# Patient Record
Sex: MALE | Race: WHITE | NOT HISPANIC OR LATINO | Employment: FULL TIME | URBAN - NONMETROPOLITAN AREA
[De-identification: names, ages, dates, MRNs, and addresses within clinical notes are randomized per-mention and may not be internally consistent; named-entity substitution may affect disease eponyms.]

---

## 2018-12-29 ENCOUNTER — HOSPITAL ENCOUNTER (EMERGENCY)
Facility: HOSPITAL | Age: 23
Discharge: HOME | End: 2018-12-29
Attending: EMERGENCY MEDICINE | Admitting: EMERGENCY MEDICINE

## 2018-12-29 ENCOUNTER — APPOINTMENT (OUTPATIENT)
Dept: CT IMAGING | Facility: HOSPITAL | Age: 23
End: 2018-12-29

## 2018-12-29 VITALS
TEMPERATURE: 98 F | WEIGHT: 198.8 LBS | HEART RATE: 74 BPM | BODY MASS INDEX: 26.93 KG/M2 | DIASTOLIC BLOOD PRESSURE: 65 MMHG | RESPIRATION RATE: 18 BRPM | HEIGHT: 72 IN | OXYGEN SATURATION: 99 % | SYSTOLIC BLOOD PRESSURE: 135 MMHG

## 2018-12-29 DIAGNOSIS — R10.9 ABDOMINAL PAIN, UNSPECIFIED ABDOMINAL LOCATION: ICD-10-CM

## 2018-12-29 DIAGNOSIS — K37 APPENDICITIS, UNSPECIFIED APPENDICITIS TYPE: Primary | ICD-10-CM

## 2018-12-29 LAB
ALBUMIN SERPL-MCNC: 4.9 G/DL (ref 3.5–5)
ALBUMIN/GLOB SERPL: 1.8 G/DL (ref 1–2)
ALP SERPL-CCNC: 48 U/L (ref 38–126)
ALT SERPL W P-5'-P-CCNC: 41 U/L (ref 13–69)
ANION GAP SERPL CALCULATED.3IONS-SCNC: 13.1 MMOL/L (ref 10–20)
AST SERPL-CCNC: 29 U/L (ref 15–46)
BACTERIA UR QL AUTO: ABNORMAL /HPF
BASOPHILS # BLD AUTO: 0.07 10*3/MM3 (ref 0–0.2)
BASOPHILS NFR BLD AUTO: 1 % (ref 0–2.5)
BILIRUB SERPL-MCNC: 0.7 MG/DL (ref 0.2–1.3)
BILIRUB UR QL STRIP: NEGATIVE
BUN BLD-MCNC: 9 MG/DL (ref 7–20)
BUN/CREAT SERPL: 15 (ref 6.3–21.9)
CALCIUM SPEC-SCNC: 9.3 MG/DL (ref 8.4–10.2)
CHLORIDE SERPL-SCNC: 104 MMOL/L (ref 98–107)
CLARITY UR: CLEAR
CO2 SERPL-SCNC: 28 MMOL/L (ref 26–30)
COLOR UR: ABNORMAL
CREAT BLD-MCNC: 0.6 MG/DL (ref 0.6–1.3)
DEPRECATED RDW RBC AUTO: 42.9 FL (ref 37–54)
EOSINOPHIL # BLD AUTO: 0.22 10*3/MM3 (ref 0–0.7)
EOSINOPHIL NFR BLD AUTO: 3 % (ref 0–7)
ERYTHROCYTE [DISTWIDTH] IN BLOOD BY AUTOMATED COUNT: 13.2 % (ref 11.5–14.5)
GFR SERPL CREATININE-BSD FRML MDRD: >150 ML/MIN/1.73
GLOBULIN UR ELPH-MCNC: 2.7 GM/DL
GLUCOSE BLD-MCNC: 87 MG/DL (ref 74–98)
GLUCOSE UR STRIP-MCNC: NEGATIVE MG/DL
HCT VFR BLD AUTO: 44.8 % (ref 42–52)
HGB BLD-MCNC: 15.1 G/DL (ref 14–18)
HGB UR QL STRIP.AUTO: NEGATIVE
HYALINE CASTS UR QL AUTO: ABNORMAL /LPF
IMM GRANULOCYTES # BLD AUTO: 0.02 10*3/MM3 (ref 0–0.06)
IMM GRANULOCYTES NFR BLD AUTO: 0.3 % (ref 0–0.6)
KETONES UR QL STRIP: NEGATIVE
LEUKOCYTE ESTERASE UR QL STRIP.AUTO: ABNORMAL
LYMPHOCYTES # BLD AUTO: 2.28 10*3/MM3 (ref 0.6–3.4)
LYMPHOCYTES NFR BLD AUTO: 31.2 % (ref 10–50)
MCH RBC QN AUTO: 29.7 PG (ref 27–31)
MCHC RBC AUTO-ENTMCNC: 33.7 G/DL (ref 30–37)
MCV RBC AUTO: 88 FL (ref 80–94)
MONOCYTES # BLD AUTO: 0.53 10*3/MM3 (ref 0–0.9)
MONOCYTES NFR BLD AUTO: 7.3 % (ref 0–12)
NEUTROPHILS # BLD AUTO: 4.19 10*3/MM3 (ref 2–6.9)
NEUTROPHILS NFR BLD AUTO: 57.2 % (ref 37–80)
NITRITE UR QL STRIP: NEGATIVE
NRBC BLD AUTO-RTO: 0 /100 WBC (ref 0–0)
PH UR STRIP.AUTO: 6.5 [PH] (ref 5–8)
PLATELET # BLD AUTO: 190 10*3/MM3 (ref 130–400)
PMV BLD AUTO: 9.5 FL (ref 6–12)
POTASSIUM BLD-SCNC: 4.1 MMOL/L (ref 3.5–5.1)
PROT SERPL-MCNC: 7.6 G/DL (ref 6.3–8.2)
PROT UR QL STRIP: NEGATIVE
RBC # BLD AUTO: 5.09 10*6/MM3 (ref 4.7–6.1)
RBC # UR: ABNORMAL /HPF
REF LAB TEST METHOD: ABNORMAL
SODIUM BLD-SCNC: 141 MMOL/L (ref 137–145)
SP GR UR STRIP: <=1.005 (ref 1–1.03)
SQUAMOUS #/AREA URNS HPF: ABNORMAL /HPF
UROBILINOGEN UR QL STRIP: ABNORMAL
WBC NRBC COR # BLD: 7.31 10*3/MM3 (ref 4.8–10.8)
WBC UR QL AUTO: ABNORMAL /HPF

## 2018-12-29 PROCEDURE — 80053 COMPREHEN METABOLIC PANEL: CPT | Performed by: EMERGENCY MEDICINE

## 2018-12-29 PROCEDURE — 85025 COMPLETE CBC W/AUTO DIFF WBC: CPT | Performed by: EMERGENCY MEDICINE

## 2018-12-29 PROCEDURE — 74176 CT ABD & PELVIS W/O CONTRAST: CPT

## 2018-12-29 PROCEDURE — 81001 URINALYSIS AUTO W/SCOPE: CPT | Performed by: EMERGENCY MEDICINE

## 2018-12-29 PROCEDURE — 99283 EMERGENCY DEPT VISIT LOW MDM: CPT

## 2018-12-29 PROCEDURE — 96361 HYDRATE IV INFUSION ADD-ON: CPT

## 2018-12-29 PROCEDURE — 96374 THER/PROPH/DIAG INJ IV PUSH: CPT

## 2018-12-29 PROCEDURE — 25010000002 KETOROLAC TROMETHAMINE PER 15 MG: Performed by: EMERGENCY MEDICINE

## 2018-12-29 RX ORDER — AMOXICILLIN AND CLAVULANATE POTASSIUM 875; 125 MG/1; MG/1
1 TABLET, FILM COATED ORAL EVERY 12 HOURS
Qty: 20 TABLET | Refills: 0 | Status: SHIPPED | OUTPATIENT
Start: 2018-12-29 | End: 2019-12-19 | Stop reason: HOSPADM

## 2018-12-29 RX ORDER — KETOROLAC TROMETHAMINE 30 MG/ML
15 INJECTION, SOLUTION INTRAMUSCULAR; INTRAVENOUS ONCE
Status: COMPLETED | OUTPATIENT
Start: 2018-12-29 | End: 2018-12-29

## 2018-12-29 RX ADMIN — KETOROLAC TROMETHAMINE 15 MG: 30 INJECTION, SOLUTION INTRAMUSCULAR at 13:13

## 2018-12-29 RX ADMIN — SODIUM CHLORIDE 1000 ML: 9 INJECTION, SOLUTION INTRAVENOUS at 13:12

## 2018-12-31 ENCOUNTER — OFFICE VISIT (OUTPATIENT)
Dept: SURGERY | Facility: CLINIC | Age: 23
End: 2018-12-31

## 2018-12-31 ENCOUNTER — ANESTHESIA (OUTPATIENT)
Dept: PERIOP | Facility: HOSPITAL | Age: 23
End: 2018-12-31

## 2018-12-31 ENCOUNTER — HOSPITAL ENCOUNTER (OUTPATIENT)
Facility: HOSPITAL | Age: 23
Setting detail: HOSPITAL OUTPATIENT SURGERY
Discharge: HOME | End: 2018-12-31
Attending: SURGERY | Admitting: SURGERY

## 2018-12-31 ENCOUNTER — ANESTHESIA EVENT (OUTPATIENT)
Dept: PERIOP | Facility: HOSPITAL | Age: 23
End: 2018-12-31

## 2018-12-31 VITALS
DIASTOLIC BLOOD PRESSURE: 70 MMHG | TEMPERATURE: 98.8 F | SYSTOLIC BLOOD PRESSURE: 115 MMHG | HEART RATE: 73 BPM | RESPIRATION RATE: 18 BRPM | OXYGEN SATURATION: 100 %

## 2018-12-31 VITALS
BODY MASS INDEX: 26.68 KG/M2 | HEART RATE: 75 BPM | SYSTOLIC BLOOD PRESSURE: 148 MMHG | HEIGHT: 72 IN | WEIGHT: 197 LBS | OXYGEN SATURATION: 98 % | DIASTOLIC BLOOD PRESSURE: 82 MMHG | TEMPERATURE: 98.3 F

## 2018-12-31 DIAGNOSIS — K35.30 ACUTE APPENDICITIS WITH LOCALIZED PERITONITIS, WITHOUT PERFORATION, ABSCESS, OR GANGRENE: ICD-10-CM

## 2018-12-31 DIAGNOSIS — R10.31 RIGHT LOWER QUADRANT ABDOMINAL PAIN: Primary | ICD-10-CM

## 2018-12-31 DIAGNOSIS — R10.31 RIGHT LOWER QUADRANT ABDOMINAL PAIN: ICD-10-CM

## 2018-12-31 PROCEDURE — 99204 OFFICE O/P NEW MOD 45 MIN: CPT | Performed by: SURGERY

## 2018-12-31 PROCEDURE — 25010000003 AMPICILLIN-SULBACTAM PER 1.5 G: Performed by: SURGERY

## 2018-12-31 PROCEDURE — 25010000002 PROPOFOL 200 MG/20ML EMULSION: Performed by: NURSE ANESTHETIST, CERTIFIED REGISTERED

## 2018-12-31 PROCEDURE — 25010000002 MIDAZOLAM PER 1 MG: Performed by: NURSE ANESTHETIST, CERTIFIED REGISTERED

## 2018-12-31 PROCEDURE — 25010000002 METOCLOPRAMIDE PER 10 MG: Performed by: NURSE ANESTHETIST, CERTIFIED REGISTERED

## 2018-12-31 PROCEDURE — 99024 POSTOP FOLLOW-UP VISIT: CPT | Performed by: SURGERY

## 2018-12-31 PROCEDURE — 25010000002 ONDANSETRON PER 1 MG

## 2018-12-31 PROCEDURE — 25010000002 KETOROLAC TROMETHAMINE PER 15 MG: Performed by: NURSE ANESTHETIST, CERTIFIED REGISTERED

## 2018-12-31 PROCEDURE — 25010000002 DEXAMETHASONE PER 1 MG: Performed by: NURSE ANESTHETIST, CERTIFIED REGISTERED

## 2018-12-31 PROCEDURE — 25010000002 ONDANSETRON PER 1 MG: Performed by: NURSE ANESTHETIST, CERTIFIED REGISTERED

## 2018-12-31 PROCEDURE — 44970 LAPAROSCOPY APPENDECTOMY: CPT | Performed by: SURGERY

## 2018-12-31 PROCEDURE — 25010000002 HYDROMORPHONE 1 MG/ML SOLUTION

## 2018-12-31 PROCEDURE — 25010000002 FENTANYL CITRATE (PF) 100 MCG/2ML SOLUTION: Performed by: NURSE ANESTHETIST, CERTIFIED REGISTERED

## 2018-12-31 DEVICE — ENDOPATH ETS45 2.5MM RELOADS (VASCULAR/THIN)
Type: IMPLANTABLE DEVICE | Site: ABDOMEN | Status: FUNCTIONAL
Brand: ENDOPATH

## 2018-12-31 RX ORDER — FENTANYL CITRATE 50 UG/ML
INJECTION, SOLUTION INTRAMUSCULAR; INTRAVENOUS AS NEEDED
Status: DISCONTINUED | OUTPATIENT
Start: 2018-12-31 | End: 2018-12-31 | Stop reason: SURG

## 2018-12-31 RX ORDER — MEPERIDINE HYDROCHLORIDE 50 MG/ML
25 INJECTION INTRAMUSCULAR; INTRAVENOUS; SUBCUTANEOUS
Status: COMPLETED | OUTPATIENT
Start: 2018-12-31 | End: 2018-12-31

## 2018-12-31 RX ORDER — ROCURONIUM BROMIDE 10 MG/ML
INJECTION, SOLUTION INTRAVENOUS AS NEEDED
Status: DISCONTINUED | OUTPATIENT
Start: 2018-12-31 | End: 2018-12-31 | Stop reason: SURG

## 2018-12-31 RX ORDER — KETOROLAC TROMETHAMINE 30 MG/ML
INJECTION, SOLUTION INTRAMUSCULAR; INTRAVENOUS AS NEEDED
Status: DISCONTINUED | OUTPATIENT
Start: 2018-12-31 | End: 2018-12-31 | Stop reason: SURG

## 2018-12-31 RX ORDER — PROPOFOL 10 MG/ML
INJECTION, EMULSION INTRAVENOUS AS NEEDED
Status: DISCONTINUED | OUTPATIENT
Start: 2018-12-31 | End: 2018-12-31 | Stop reason: SURG

## 2018-12-31 RX ORDER — ONDANSETRON 2 MG/ML
4 INJECTION INTRAMUSCULAR; INTRAVENOUS ONCE AS NEEDED
Status: COMPLETED | OUTPATIENT
Start: 2018-12-31 | End: 2018-12-31

## 2018-12-31 RX ORDER — PROMETHAZINE HYDROCHLORIDE 25 MG/ML
12.5 INJECTION, SOLUTION INTRAMUSCULAR; INTRAVENOUS ONCE AS NEEDED
Status: DISCONTINUED | OUTPATIENT
Start: 2018-12-31 | End: 2018-12-31 | Stop reason: HOSPADM

## 2018-12-31 RX ORDER — ONDANSETRON 2 MG/ML
INJECTION INTRAMUSCULAR; INTRAVENOUS AS NEEDED
Status: DISCONTINUED | OUTPATIENT
Start: 2018-12-31 | End: 2018-12-31 | Stop reason: SURG

## 2018-12-31 RX ORDER — SODIUM CHLORIDE 9 MG/ML
100 INJECTION, SOLUTION INTRAVENOUS CONTINUOUS
Status: DISCONTINUED | OUTPATIENT
Start: 2018-12-31 | End: 2018-12-31 | Stop reason: HOSPADM

## 2018-12-31 RX ORDER — ONDANSETRON 2 MG/ML
4 INJECTION INTRAMUSCULAR; INTRAVENOUS ONCE AS NEEDED
Status: DISCONTINUED | OUTPATIENT
Start: 2018-12-31 | End: 2018-12-31 | Stop reason: HOSPADM

## 2018-12-31 RX ORDER — ONDANSETRON 4 MG/1
4 TABLET, FILM COATED ORAL ONCE AS NEEDED
Status: DISCONTINUED | OUTPATIENT
Start: 2018-12-31 | End: 2018-12-31 | Stop reason: HOSPADM

## 2018-12-31 RX ORDER — SODIUM CHLORIDE 9 MG/ML
100 INJECTION, SOLUTION INTRAVENOUS CONTINUOUS
Status: CANCELLED | OUTPATIENT
Start: 2018-12-31

## 2018-12-31 RX ORDER — MORPHINE SULFATE 2 MG/ML
2 INJECTION, SOLUTION INTRAMUSCULAR; INTRAVENOUS
Status: DISCONTINUED | OUTPATIENT
Start: 2018-12-31 | End: 2018-12-31 | Stop reason: HOSPADM

## 2018-12-31 RX ORDER — BUPIVACAINE HYDROCHLORIDE 5 MG/ML
INJECTION, SOLUTION EPIDURAL; INTRACAUDAL AS NEEDED
Status: DISCONTINUED | OUTPATIENT
Start: 2018-12-31 | End: 2018-12-31 | Stop reason: HOSPADM

## 2018-12-31 RX ORDER — DEXAMETHASONE SODIUM PHOSPHATE 4 MG/ML
INJECTION, SOLUTION INTRA-ARTICULAR; INTRALESIONAL; INTRAMUSCULAR; INTRAVENOUS; SOFT TISSUE AS NEEDED
Status: DISCONTINUED | OUTPATIENT
Start: 2018-12-31 | End: 2018-12-31 | Stop reason: SURG

## 2018-12-31 RX ORDER — ONDANSETRON 2 MG/ML
INJECTION INTRAMUSCULAR; INTRAVENOUS
Status: COMPLETED
Start: 2018-12-31 | End: 2018-12-31

## 2018-12-31 RX ORDER — HYDROCODONE BITARTRATE AND ACETAMINOPHEN 7.5; 325 MG/1; MG/1
1-2 TABLET ORAL EVERY 4 HOURS PRN
Qty: 20 TABLET | Refills: 0 | Status: SHIPPED | OUTPATIENT
Start: 2018-12-31 | End: 2019-01-09

## 2018-12-31 RX ORDER — NEOSTIGMINE METHYLSULFATE 5 MG/5 ML
SYRINGE (ML) INTRAVENOUS AS NEEDED
Status: DISCONTINUED | OUTPATIENT
Start: 2018-12-31 | End: 2018-12-31 | Stop reason: SURG

## 2018-12-31 RX ORDER — IPRATROPIUM BROMIDE AND ALBUTEROL SULFATE 2.5; .5 MG/3ML; MG/3ML
3 SOLUTION RESPIRATORY (INHALATION) ONCE
Status: DISCONTINUED | OUTPATIENT
Start: 2018-12-31 | End: 2018-12-31 | Stop reason: HOSPADM

## 2018-12-31 RX ORDER — IBUPROFEN 600 MG/1
600 TABLET ORAL EVERY 6 HOURS PRN
Status: DISCONTINUED | OUTPATIENT
Start: 2018-12-31 | End: 2018-12-31 | Stop reason: HOSPADM

## 2018-12-31 RX ORDER — GLYCOPYRROLATE 0.2 MG/ML
INJECTION INTRAMUSCULAR; INTRAVENOUS AS NEEDED
Status: DISCONTINUED | OUTPATIENT
Start: 2018-12-31 | End: 2018-12-31 | Stop reason: SURG

## 2018-12-31 RX ORDER — MAGNESIUM HYDROXIDE 1200 MG/15ML
LIQUID ORAL AS NEEDED
Status: DISCONTINUED | OUTPATIENT
Start: 2018-12-31 | End: 2018-12-31 | Stop reason: HOSPADM

## 2018-12-31 RX ORDER — MEPERIDINE HYDROCHLORIDE 50 MG/ML
INJECTION INTRAMUSCULAR; INTRAVENOUS; SUBCUTANEOUS
Status: COMPLETED
Start: 2018-12-31 | End: 2018-12-31

## 2018-12-31 RX ORDER — METOCLOPRAMIDE HYDROCHLORIDE 5 MG/ML
INJECTION INTRAMUSCULAR; INTRAVENOUS AS NEEDED
Status: DISCONTINUED | OUTPATIENT
Start: 2018-12-31 | End: 2018-12-31 | Stop reason: SURG

## 2018-12-31 RX ORDER — MIDAZOLAM HYDROCHLORIDE 1 MG/ML
INJECTION INTRAMUSCULAR; INTRAVENOUS AS NEEDED
Status: DISCONTINUED | OUTPATIENT
Start: 2018-12-31 | End: 2018-12-31 | Stop reason: SURG

## 2018-12-31 RX ADMIN — ONDANSETRON 4 MG: 2 INJECTION INTRAMUSCULAR; INTRAVENOUS at 14:21

## 2018-12-31 RX ADMIN — METOCLOPRAMIDE 10 MG: 5 INJECTION, SOLUTION INTRAMUSCULAR; INTRAVENOUS at 13:25

## 2018-12-31 RX ADMIN — SODIUM CHLORIDE 100 ML/HR: 9 INJECTION, SOLUTION INTRAVENOUS at 11:58

## 2018-12-31 RX ADMIN — FENTANYL CITRATE 100 MCG: 50 INJECTION, SOLUTION INTRAMUSCULAR; INTRAVENOUS at 13:25

## 2018-12-31 RX ADMIN — SODIUM CHLORIDE: 9 INJECTION, SOLUTION INTRAVENOUS at 13:45

## 2018-12-31 RX ADMIN — Medication 0.5 MG: at 15:19

## 2018-12-31 RX ADMIN — HYDROMORPHONE HYDROCHLORIDE 0.5 MG: 1 INJECTION, SOLUTION INTRAMUSCULAR; INTRAVENOUS; SUBCUTANEOUS at 15:05

## 2018-12-31 RX ADMIN — Medication 0.5 MG: at 15:05

## 2018-12-31 RX ADMIN — DEXAMETHASONE SODIUM PHOSPHATE 10 MG: 4 INJECTION, SOLUTION INTRAMUSCULAR; INTRAVENOUS at 13:25

## 2018-12-31 RX ADMIN — PROPOFOL 200 MG: 10 INJECTION, EMULSION INTRAVENOUS at 13:25

## 2018-12-31 RX ADMIN — HYDROMORPHONE HYDROCHLORIDE 0.5 MG: 1 INJECTION, SOLUTION INTRAMUSCULAR; INTRAVENOUS; SUBCUTANEOUS at 15:19

## 2018-12-31 RX ADMIN — SODIUM CHLORIDE 3 G: 9 INJECTION, SOLUTION INTRAVENOUS at 13:21

## 2018-12-31 RX ADMIN — ROCURONIUM BROMIDE 30 MG: 10 INJECTION INTRAVENOUS at 13:25

## 2018-12-31 RX ADMIN — HYDROMORPHONE HYDROCHLORIDE 0.5 MG: 1 INJECTION, SOLUTION INTRAMUSCULAR; INTRAVENOUS; SUBCUTANEOUS at 14:36

## 2018-12-31 RX ADMIN — MEPERIDINE HYDROCHLORIDE 25 MG: 50 INJECTION INTRAMUSCULAR; INTRAVENOUS; SUBCUTANEOUS at 14:41

## 2018-12-31 RX ADMIN — Medication 3 MG: at 14:00

## 2018-12-31 RX ADMIN — GLYCOPYRROLATE 0.4 MG: 0.2 INJECTION, SOLUTION INTRAMUSCULAR; INTRAVENOUS at 14:00

## 2018-12-31 RX ADMIN — KETOROLAC TROMETHAMINE 30 MG: 30 INJECTION, SOLUTION INTRAMUSCULAR at 13:54

## 2018-12-31 RX ADMIN — LIDOCAINE HYDROCHLORIDE 100 MG: 20 INJECTION, SOLUTION INTRAVENOUS at 13:25

## 2018-12-31 RX ADMIN — KETOROLAC TROMETHAMINE 30 MG: 30 INJECTION, SOLUTION INTRAMUSCULAR at 13:53

## 2018-12-31 RX ADMIN — Medication 0.5 MG: at 14:36

## 2018-12-31 RX ADMIN — ONDANSETRON 4 MG: 2 INJECTION INTRAMUSCULAR; INTRAVENOUS at 13:25

## 2018-12-31 RX ADMIN — Medication 0.5 MG: at 14:20

## 2018-12-31 RX ADMIN — HYDROMORPHONE HYDROCHLORIDE 0.5 MG: 1 INJECTION, SOLUTION INTRAMUSCULAR; INTRAVENOUS; SUBCUTANEOUS at 14:20

## 2018-12-31 RX ADMIN — MIDAZOLAM HYDROCHLORIDE 2 MG: 1 INJECTION, SOLUTION INTRAMUSCULAR; INTRAVENOUS at 13:18

## 2018-12-31 NOTE — PROGRESS NOTES
Patient: Dhiraj Stokes    YOB: 1995    Date: 12/31/2018    Primary Care Provider: Provider, No Known    Chief Complaint   Patient presents with   • Abdominal Pain       Subjective .     History of present illness:  The patient is in the office today for shooting abdominal pain from the right inguinal region into his groin.  It started a week ago and is getting worse.  He complains of nausea, vomiting, and constipation.  He denies any rectal bleeding or diarrhea.   He had a ct scan that showed that the appendix is borderline in size but without definite adjacent fat stranding. Early appendicitis thought to be an unlikely consideration but not entirely excluded and should be correlated with the patient's clinical presentation. If there is concern for developing appendicitis,  short-term follow-up with intravenous and oral contrast and 48 hours could be considered.    As mentioned above the patient did present to the emergency room this past Saturday with mild right lower quadrant abdominal discomfort.  CT scan was performed at that time and showed very minimal dilation of the appendix and the patient was subsequent sent home with oral antibiotics.  He sent to my office morning as a follow-up.  He continues to complain of right lower quadrant abdominal discomfort, dull in nature, worse with movement, nonradiating in nature, not associated with a fever, nothing seems to make it better.    The following portions of the patient's history were reviewed and updated as appropriate: allergies, current medications, past family history, past medical history, past social history, past surgical history and problem list.       Review of Systems   Constitutional: Negative for chills, fever and unexpected weight change.   HENT: Negative for trouble swallowing and voice change.    Eyes: Negative for visual disturbance.   Respiratory: Negative for apnea, cough, chest tightness, shortness of breath and wheezing.   "  Cardiovascular: Negative for chest pain, palpitations and leg swelling.   Gastrointestinal: Positive for abdominal pain, constipation, nausea and vomiting. Negative for abdominal distention, anal bleeding, blood in stool, diarrhea and rectal pain.   Endocrine: Negative for cold intolerance and heat intolerance.   Genitourinary: Negative for difficulty urinating, dysuria, flank pain, scrotal swelling and testicular pain.   Musculoskeletal: Negative for back pain, gait problem and joint swelling.   Skin: Negative for color change, rash and wound.   Neurological: Negative for dizziness, syncope, speech difficulty, weakness, numbness and headaches.   Hematological: Negative for adenopathy. Does not bruise/bleed easily.   Psychiatric/Behavioral: Negative for confusion. The patient is not nervous/anxious.        Allergies:  No Known Allergies    Medications:    Current Outpatient Medications:   •  amoxicillin-clavulanate (AUGMENTIN) 875-125 MG per tablet, Take 1 tablet by mouth Every 12 (Twelve) Hours., Disp: 20 tablet, Rfl: 0    History\"  History reviewed. No pertinent past medical history.    Past Surgical History:   Procedure Laterality Date   • NO PAST SURGERIES         Family History   Problem Relation Age of Onset   • No Known Problems Mother    • Hypertension Father        Social History     Tobacco Use   • Smoking status: Current Every Day Smoker     Packs/day: 1.00     Types: Cigarettes   • Smokeless tobacco: Never Used   Substance Use Topics   • Alcohol use: No     Frequency: Never   • Drug use: No        Objective     Vital Signs:   Vitals:    12/31/18 1112   BP: 148/82   Pulse: 75   Temp: 98.3 °F (36.8 °C)   TempSrc: Temporal   SpO2: 98%   Weight: 89.4 kg (197 lb)   Height: 182.9 cm (72\")       Physical Exam:   General Appearance:    Alert, cooperative, in no acute distress   Head:    Normocephalic, without obvious abnormality, atraumatic   Eyes:            Lids and lashes normal, conjunctivae and sclerae " normal, no   icterus, no pallor, corneas clear, PERRLA   Ears:    Ears appear intact with no abnormalities noted   Throat:   No oral lesions, no thrush, oral mucosa moist   Neck:   No adenopathy, supple, trachea midline, no thyromegaly, no   carotid bruit, no JVD   Lungs:     Clear to auscultation,respirations regular, even and                  unlabored    Heart:    Regular rhythm and normal rate, normal S1 and S2, no            murmur, no gallop, no rub, no click   Chest Wall:    No abnormalities observed   Abdomen:     Normal bowel sounds, no masses, no organomegaly, soft        mildly tender in the right lower quadrant to deep palpation, non-distended, no guarding, no rebound                tenderness   Extremities:   Moves all extremities well, no edema, no cyanosis, no             redness   Pulses:   Pulses palpable and equal bilaterally   Skin:   No bleeding, bruising or rash   Lymph nodes:   No palpable adenopathy   Neurologic:   Cranial nerves 2 - 12 grossly intact, sensation intact, DTR       present and equal bilaterally     Results Review:   I reviewed the patient's new clinical results.  I reviewed the patient's new imaging results and agree with the interpretation.  I reviewed the patient's other test results and agree with the interpretation    Assessment/Plan     1. Right lower quadrant abdominal pain    2. Acute appendicitis with localized peritonitis, without perforation, abscess, or gangrene        I did have a detailed and extensive discussion with the patient and his girlfriend in the office today.  He has not improved despite 48 hours of oral antibiotics and I have reviewed the patient's recent CT scan.  I think the patient needs to go to the operating room for diagnostic laparoscopy and laparoscopic appendectomy.  Full risks and benefits of operative versus nonoperative intervention have been discussed with the patient in the girlfriend including the possibility of nonresolution of  symptomatology and possible worsening of symptomatology without operative intervention versus infection, bleeding, possible open appendectomy, possible normal appendectomy, etc. with operative intervention.  The patient in the girlfriend understand, agree, and wish to proceed with the above-mentioned surgical treatment plan.  The patient understands that he has failed conservative therapy and I am happy that he didn't follow-up with me today.    I discussed the patients findings and my recommendations with patient and family.    Review of Systems was reviewed and confirmed as accurate today.    Electronically signed by Josesito Herron MD  12/31/18      Portions of this note have been scribed for Josesito Herron MD by Michelle Milligan. 12/31/2018  11:21 AM

## 2019-01-02 ENCOUNTER — TELEPHONE (OUTPATIENT)
Dept: SURGERY | Facility: CLINIC | Age: 24
End: 2019-01-02

## 2019-01-02 NOTE — TELEPHONE ENCOUNTER
Per Dr. Herron, pt was given a written Rx for Norco 7.5mg/325 mg, sig: one po q 6 hours prn pain, #20 no refill, pt also given a return to work note for 01/07/2019.

## 2019-01-02 NOTE — TELEPHONE ENCOUNTER
Patient called requesting more pain medication. He had a lap appy on 12/31/2018 and also wanted to know when he could return to work and weight restrictions?

## 2019-01-07 LAB
LAB AP CASE REPORT: NORMAL
PATH REPORT.FINAL DX SPEC: NORMAL

## 2019-01-09 ENCOUNTER — OFFICE VISIT (OUTPATIENT)
Dept: SURGERY | Facility: CLINIC | Age: 24
End: 2019-01-09

## 2019-01-09 VITALS
TEMPERATURE: 98.6 F | OXYGEN SATURATION: 99 % | HEART RATE: 68 BPM | HEIGHT: 72 IN | DIASTOLIC BLOOD PRESSURE: 68 MMHG | BODY MASS INDEX: 26.68 KG/M2 | SYSTOLIC BLOOD PRESSURE: 122 MMHG | WEIGHT: 197 LBS | RESPIRATION RATE: 16 BRPM

## 2019-01-09 DIAGNOSIS — Z48.89 POSTOPERATIVE VISIT: Primary | ICD-10-CM

## 2019-01-09 PROCEDURE — 99024 POSTOP FOLLOW-UP VISIT: CPT | Performed by: SURGERY

## 2019-01-09 NOTE — PROGRESS NOTES
"Patient: Dhiraj Stokes    YOB: 1995    Date: 01/09/2019    Primary Care Provider: Provider, No Known    Reason for Consultation: Follow-up laparoscopic appendectomy.    Chief Complaint   Patient presents with   • Post-op Follow-up     laparoscopic appendectomy.       History of present illness:  I saw the patient in the office today as a followup from their recent laparoscopic appendectomy which was done on 12/31/2018.  They state that they have done well and are having no complaints. Pt stated that he is ready to return to work and he needs a release to do so.    The following portions of the patient's history were reviewed and updated as appropriate: allergies, current medications, past family history, past medical history, past social history, past surgical history and problem list.    Vital Signs:   Vitals:    01/09/19 1305   BP: 122/68   Pulse: 68   Resp: 16   Temp: 98.6 °F (37 °C)   TempSrc: Temporal   SpO2: 99%   Weight: 89.4 kg (197 lb)   Height: 182.9 cm (72\")       Physical Exam:   General Appearance:    Alert, cooperative, in no acute distress   Abdomen:     no masses, no organomegaly, soft non-tender, non-distended, no guarding, wounds are well healed     Assessment / Plan:    1. Postoperative visit        I did discuss the situation with the patient today in the office and they have done well from their recent laparoscopic appendectomy.  I have released the patient back to normal activity, they understand that they need to be careful about heavy lifting.  I need to see the patient back in the office only if they are having further problems, they know to call me if they are.      Electronically signed by Josesito Herron MD  01/09/19            Portions of this note have been scribed for Josesito Herron MD by Luz Fitch. 1/9/2019  1:30 PM        "

## 2019-12-19 ENCOUNTER — HOSPITAL ENCOUNTER (EMERGENCY)
Facility: HOSPITAL | Age: 24
Discharge: HOME | End: 2019-12-19
Attending: STUDENT IN AN ORGANIZED HEALTH CARE EDUCATION/TRAINING PROGRAM | Admitting: STUDENT IN AN ORGANIZED HEALTH CARE EDUCATION/TRAINING PROGRAM

## 2019-12-19 VITALS
OXYGEN SATURATION: 95 % | HEART RATE: 80 BPM | RESPIRATION RATE: 16 BRPM | HEIGHT: 72 IN | SYSTOLIC BLOOD PRESSURE: 155 MMHG | WEIGHT: 236 LBS | DIASTOLIC BLOOD PRESSURE: 69 MMHG | BODY MASS INDEX: 31.97 KG/M2 | TEMPERATURE: 98.2 F

## 2019-12-19 DIAGNOSIS — S61.211A LACERATION OF LEFT INDEX FINGER WITHOUT FOREIGN BODY WITHOUT DAMAGE TO NAIL, INITIAL ENCOUNTER: Primary | ICD-10-CM

## 2019-12-19 PROCEDURE — 25010000003 LIDOCAINE 1 % SOLUTION: Performed by: PHYSICIAN ASSISTANT

## 2019-12-19 PROCEDURE — 99282 EMERGENCY DEPT VISIT SF MDM: CPT

## 2019-12-19 RX ORDER — LIDOCAINE HYDROCHLORIDE 10 MG/ML
10 INJECTION, SOLUTION INFILTRATION; PERINEURAL ONCE
Status: COMPLETED | OUTPATIENT
Start: 2019-12-19 | End: 2019-12-19

## 2019-12-19 RX ADMIN — LIDOCAINE HYDROCHLORIDE 10 ML: 10 INJECTION, SOLUTION INFILTRATION; PERINEURAL at 21:28

## 2019-12-20 NOTE — ED PROVIDER NOTES
Subjective   Patient is here with complaint of accidental laceration left hand, index finger using an [employer] knife at his work... Occurred within the past hour no numbness tingling patient is current with his immunization tetanus status he is right-hand dominant presents here for further evaluation      History provided by:  Patient      Review of Systems   HENT: Negative.    Respiratory: Negative.    Cardiovascular: Negative.    Gastrointestinal: Negative.    Musculoskeletal: Negative.    Skin: Positive for wound.   Neurological: Negative.    Psychiatric/Behavioral: Negative.    All other systems reviewed and are negative.      History reviewed. No pertinent past medical history.    No Known Allergies    Past Surgical History:   Procedure Laterality Date   • APPENDECTOMY N/A 12/31/2018    Procedure: APPENDECTOMY LAPAROSCOPIC;  Surgeon: Josesito Herron MD;  Location: [facility] [place];  Service: General   • NO PAST SURGERIES         Family History   Problem Relation Age of Onset   • No Known Problems Mother    • Hypertension Father        Social History     Socioeconomic History   • Marital status: Single     Spouse name: Not on file   • Number of children: Not on file   • Years of education: Not on file   • Highest education level: Not on file   Tobacco Use   • Smoking status: Current Every Day Smoker     Packs/day: 1.00     Types: Cigarettes   • Smokeless tobacco: Never Used   Substance and Sexual Activity   • Alcohol use: No     Frequency: Never   • Drug use: No   • Sexual activity: Defer           Objective   Physical Exam   Constitutional: He is oriented to person, place, and time. He appears well-developed and well-nourished.   Afebrile nontoxic no acute distress   HENT:   Head: Normocephalic and atraumatic.   Eyes: Pupils are equal, round, and reactive to light. EOM are normal.   Neck: Normal range of motion. Neck supple.   Cardiovascular: Normal rate and intact distal pulses.   Pulmonary/Chest: Effort normal.    Abdominal: Soft.   Musculoskeletal: Normal range of motion. He exhibits no edema or deformity.   Full range of motion left index   Neurological: He is alert and oriented to person, place, and time. No cranial nerve deficit or sensory deficit. He exhibits normal muscle tone. Coordination normal.   Skin: Skin is warm and dry. Capillary refill takes less than 2 seconds.   Superficial flap volar pad left index.. Approximately 1 cm   Psychiatric: He has a normal mood and affect. His behavior is normal. Judgment and thought content normal.   Nursing note and vitals reviewed.      Laceration Repair  Date/Time: 12/19/2019 9:43 PM  Performed by: Per Canela PA-C  Authorized by: Jesus Turpin MD     Consent:     Consent obtained:  Verbal    Consent given by:  Patient  Anesthesia (see MAR for exact dosages):     Anesthesia method:  Local infiltration    Local anesthetic:  Lidocaine 1% w/o epi  Laceration details:     Location:  Finger    Finger location:  L index finger    Length (cm):  1    Depth (mm):  1  Repair type:     Repair type:  Simple  Exploration:     Wound exploration: wound explored through full range of motion and entire depth of wound probed and visualized      Wound extent: no foreign bodies/material noted      Contaminated: no    Treatment:     Area cleansed with:  Soap and water    Irrigation solution:  Tap water    Irrigation volume:  50    Irrigation method:  Tap    Visualized foreign bodies/material removed: no    Skin repair:     Repair method:  Sutures    Suture size:  4-0    Suture material:  Nylon    Suture technique:  Simple interrupted    Number of sutures:  2  Approximation:     Approximation:  Close  Post-procedure details:     Dressing:  Non-adherent dressing               ED Course                      No data recorded                        MDM  Number of Diagnoses or Management Options     Amount and/or Complexity of Data Reviewed  Review and summarize past medical records:  yes  Discuss the patient with other providers: yes    Risk of Complications, Morbidity, and/or Mortality  Presenting problems: low  Diagnostic procedures: low  Management options: low        Final diagnoses:   Laceration of left index finger without foreign body without damage to nail, initial encounter              Per Canela PA-C  12/19/19 2145       Per Canela PA-C  12/19/19 2152

## 2019-12-20 NOTE — DISCHARGE INSTRUCTIONS
Change the dressing daily, have sutures removed in 8 to 9 days follow-up with your [facility] physician group as per protocol

## 2020-03-16 ENCOUNTER — OFFICE VISIT (OUTPATIENT)
Dept: INTERNAL MEDICINE | Facility: CLINIC | Age: 25
End: 2020-03-16

## 2020-03-16 ENCOUNTER — HOSPITAL ENCOUNTER (OUTPATIENT)
Dept: GENERAL RADIOLOGY | Facility: HOSPITAL | Age: 25
Discharge: HOME | End: 2020-03-16
Admitting: PHYSICIAN ASSISTANT

## 2020-03-16 VITALS
HEIGHT: 72 IN | WEIGHT: 247 LBS | TEMPERATURE: 98.7 F | DIASTOLIC BLOOD PRESSURE: 98 MMHG | OXYGEN SATURATION: 99 % | HEART RATE: 86 BPM | SYSTOLIC BLOOD PRESSURE: 148 MMHG | BODY MASS INDEX: 33.46 KG/M2

## 2020-03-16 DIAGNOSIS — G89.29 CHRONIC NECK PAIN: ICD-10-CM

## 2020-03-16 DIAGNOSIS — Z76.89 ENCOUNTER TO ESTABLISH CARE: Primary | ICD-10-CM

## 2020-03-16 DIAGNOSIS — G89.29 CHRONIC UPPER BACK PAIN: ICD-10-CM

## 2020-03-16 DIAGNOSIS — M54.2 CHRONIC NECK PAIN: ICD-10-CM

## 2020-03-16 DIAGNOSIS — F41.1 GENERALIZED ANXIETY DISORDER: ICD-10-CM

## 2020-03-16 DIAGNOSIS — M54.9 CHRONIC UPPER BACK PAIN: ICD-10-CM

## 2020-03-16 DIAGNOSIS — G25.81 RESTLESS LEGS: ICD-10-CM

## 2020-03-16 PROBLEM — K35.30 ACUTE APPENDICITIS WITH LOCALIZED PERITONITIS, WITHOUT PERFORATION, ABSCESS, OR GANGRENE: Status: RESOLVED | Noted: 2018-12-31 | Resolved: 2020-03-16

## 2020-03-16 PROCEDURE — 72072 X-RAY EXAM THORAC SPINE 3VWS: CPT

## 2020-03-16 PROCEDURE — 72040 X-RAY EXAM NECK SPINE 2-3 VW: CPT

## 2020-03-16 PROCEDURE — 99204 OFFICE O/P NEW MOD 45 MIN: CPT | Performed by: PHYSICIAN ASSISTANT

## 2020-03-16 RX ORDER — ROPINIROLE 0.5 MG/1
0.5 TABLET, FILM COATED ORAL NIGHTLY PRN
Qty: 30 TABLET | Refills: 2 | Status: SHIPPED | OUTPATIENT
Start: 2020-03-16 | End: 2021-09-16

## 2020-03-16 RX ORDER — FLUOXETINE 10 MG/1
10 CAPSULE ORAL DAILY
Qty: 30 CAPSULE | Refills: 1 | Status: SHIPPED | OUTPATIENT
Start: 2020-03-16 | End: 2020-04-02 | Stop reason: SINTOL

## 2020-03-16 RX ORDER — PROPRANOLOL HYDROCHLORIDE 10 MG/1
10 TABLET ORAL 3 TIMES DAILY PRN
Qty: 90 TABLET | Refills: 1 | Status: SHIPPED | OUTPATIENT
Start: 2020-03-16 | End: 2020-04-02 | Stop reason: SINTOL
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
## 2020-03-24 DIAGNOSIS — M54.2 CHRONIC NECK PAIN: Primary | ICD-10-CM

## 2020-03-24 DIAGNOSIS — M54.9 CHRONIC UPPER BACK PAIN: ICD-10-CM

## 2020-03-24 DIAGNOSIS — G89.29 CHRONIC NECK PAIN: Primary | ICD-10-CM

## 2020-03-24 DIAGNOSIS — G89.29 CHRONIC UPPER BACK PAIN: ICD-10-CM

## 2020-04-02 DIAGNOSIS — F41.1 GENERALIZED ANXIETY DISORDER: Primary | ICD-10-CM

## 2020-04-02 RX ORDER — BUSPIRONE HYDROCHLORIDE 10 MG/1
10 TABLET ORAL 3 TIMES DAILY PRN
Qty: 90 TABLET | Refills: 5 | Status: SHIPPED | OUTPATIENT
Start: 2020-04-02 | End: 2021-09-16

## 2020-04-02 RX ORDER — ESCITALOPRAM OXALATE 5 MG/1
5 TABLET ORAL DAILY
Qty: 30 TABLET | Refills: 5 | Status: SHIPPED | OUTPATIENT
Start: 2020-04-02 | End: 2021-09-16

## 2020-04-13 DIAGNOSIS — G89.29 CHRONIC NECK PAIN: ICD-10-CM

## 2020-04-13 DIAGNOSIS — M54.9 CHRONIC UPPER BACK PAIN: Primary | ICD-10-CM

## 2020-04-13 DIAGNOSIS — G89.29 CHRONIC UPPER BACK PAIN: Primary | ICD-10-CM

## 2020-04-13 DIAGNOSIS — M54.2 CHRONIC NECK PAIN: ICD-10-CM

## 2021-09-15 ENCOUNTER — APPOINTMENT (OUTPATIENT)
Dept: GENERAL RADIOLOGY | Facility: HOSPITAL | Age: 26
End: 2021-09-15

## 2021-09-15 ENCOUNTER — HOSPITAL ENCOUNTER (EMERGENCY)
Facility: HOSPITAL | Age: 26
Discharge: HOME | End: 2021-09-15
Attending: EMERGENCY MEDICINE | Admitting: EMERGENCY MEDICINE

## 2021-09-15 VITALS
SYSTOLIC BLOOD PRESSURE: 142 MMHG | BODY MASS INDEX: 31.8 KG/M2 | WEIGHT: 234.8 LBS | TEMPERATURE: 98.5 F | DIASTOLIC BLOOD PRESSURE: 77 MMHG | HEART RATE: 99 BPM | RESPIRATION RATE: 16 BRPM | HEIGHT: 72 IN | OXYGEN SATURATION: 100 %

## 2021-09-15 DIAGNOSIS — S46.812A STRAIN OF LEFT TRAPEZIUS MUSCLE, INITIAL ENCOUNTER: Primary | ICD-10-CM

## 2021-09-15 PROCEDURE — 99283 EMERGENCY DEPT VISIT LOW MDM: CPT

## 2021-09-15 PROCEDURE — 72040 X-RAY EXAM NECK SPINE 2-3 VW: CPT

## 2021-09-15 RX ORDER — MELOXICAM 7.5 MG/1
7.5 TABLET ORAL DAILY
Qty: 14 TABLET | Refills: 0 | Status: SHIPPED | OUTPATIENT
Start: 2021-09-15 | End: 2021-09-16

## 2021-09-15 RX ORDER — METHOCARBAMOL 500 MG/1
1500 TABLET, FILM COATED ORAL ONCE
Status: COMPLETED | OUTPATIENT
Start: 2021-09-15 | End: 2021-09-15

## 2021-09-15 RX ORDER — MELOXICAM 7.5 MG/1
7.5 TABLET ORAL DAILY
Status: DISCONTINUED | OUTPATIENT
Start: 2021-09-15 | End: 2021-09-15 | Stop reason: HOSPADM

## 2021-09-15 RX ORDER — METHOCARBAMOL 750 MG/1
750 TABLET, FILM COATED ORAL 3 TIMES DAILY PRN
Qty: 21 TABLET | Refills: 0 | Status: SHIPPED | OUTPATIENT
Start: 2021-09-15

## 2021-09-15 RX ADMIN — MELOXICAM 7.5 MG: 7.5 TABLET ORAL at 12:11

## 2021-09-15 RX ADMIN — METHOCARBAMOL 1500 MG: 500 TABLET, FILM COATED ORAL at 12:11

## 2021-09-15 NOTE — ED PROVIDER NOTES
Subjective   History of Present Illness  This is a 26 year old male who comes in today complaining left side of neck and upper back pain. He reports yesterday he was lifting furniture for a friend when he felt a pulling. Since that time he has been having increase in pain. He denies any other injuries or striking his back.   Review of Systems   Constitutional: Negative.    HENT: Negative.    Eyes: Negative.    Respiratory: Negative.    Cardiovascular: Negative.    Gastrointestinal: Negative.    Genitourinary: Negative.    Musculoskeletal: Positive for back pain and neck pain.   Skin: Negative.    Neurological: Negative.    Psychiatric/Behavioral: Negative.        Past Medical History:   Diagnosis Date   • Acute appendicitis with localized peritonitis, without perforation, abscess, or gangrene 12/31/2018    Added automatically from request for surgery [id number]       Allergies   Allergen Reactions   • Fluoxetine Mental Status Change     Agitation and lethargy.       Past Surgical History:   Procedure Laterality Date   • APPENDECTOMY N/A 12/31/2018    Procedure: APPENDECTOMY LAPAROSCOPIC;  Surgeon: Josesito Herron MD;  Location: [facility];  Service: General   • NO PAST SURGERIES         Family History   Problem Relation Age of Onset   • No Known Problems Mother    • Hypertension Father        Social History     Socioeconomic History   • Marital status: Single     Spouse name: Not on file   • Number of children: Not on file   • Years of education: Not on file   • Highest education level: Not on file   Tobacco Use   • Smoking status: Current Every Day Smoker     Packs/day: 1.00     Types: Cigarettes   • Smokeless tobacco: Never Used   Substance and Sexual Activity   • Alcohol use: No   • Drug use: No   • Sexual activity: Defer           Objective   Physical Exam  Vitals and nursing note reviewed.   Constitutional:       Appearance: Normal appearance. He is obese.   Neurological:      Mental Status: He is alert.     GEN:  No acute distress  Head: Normocephalic, atraumatic  Eyes: Pupils equal round reactive to light  ENT: Posterior pharynx normal in appearance, oral mucosa is moist  Chest: Nontender to palpation  Cardiovascular: Regular rate  Lungs: Clear to auscultation bilaterally  Abdomen: Soft, nontender, nondistended, no peritoneal signs  Extremities: No edema, normal appearance  Neuro: GCS 15  Psych: Mood and affect are appropriate  c-spine- tender left perivertebral spine to his left scapula. No step-offs.     Procedures           ED Course                                           MDM  Number of Diagnoses or Management Options  Diagnosis management comments: This appears to be a trap strain. We will treat with antiinflammatories and muscle relaxants.         Amount and/or Complexity of Data Reviewed  Tests in the radiology section of CPT®: ordered and reviewed  Review and summarize past medical records: yes  Discuss the patient with other providers: yes    Risk of Complications, Morbidity, and/or Mortality  Presenting problems: low  Diagnostic procedures: low  Management options: low        Final diagnoses:   Strain of left trapezius muscle, initial encounter       ED Disposition  ED Disposition     ED Disposition Condition Comment    Discharge Stable           Itzel Agarwal PA  [address] [place] [address] [address]  [facility] [address] [phone]    Schedule an appointment as soon as possible for a visit            Medication List      New Prescriptions    meloxicam 7.5 MG tablet  Commonly known as: MOBIC  Take 1 tablet by mouth Daily.     methocarbamol 750 MG tablet  Commonly known as: ROBAXIN  Take 1 tablet by mouth 3 (Three) Times a Day As Needed for Muscle Spasms.           Where to Get Your Medications      These medications were sent to [id number] - [place], [place] - [address] AT [facility] - [phone] PH - [phone] FX  [address] [facility] [address]    Phone:  [phone]   · meloxicam 7.5 MG tablet  · methocarbamol 750 MG tablet          Dodie Pérez, APRN  09/15/21 7950

## 2022-06-29 ENCOUNTER — APPOINTMENT (OUTPATIENT)
Dept: CT IMAGING | Facility: HOSPITAL | Age: 27
End: 2022-06-29

## 2022-06-29 ENCOUNTER — HOSPITAL ENCOUNTER (EMERGENCY)
Facility: HOSPITAL | Age: 27
Discharge: HOME | End: 2022-06-29
Attending: EMERGENCY MEDICINE | Admitting: EMERGENCY MEDICINE

## 2022-06-29 VITALS
OXYGEN SATURATION: 100 % | WEIGHT: 230 LBS | RESPIRATION RATE: 18 BRPM | HEART RATE: 89 BPM | DIASTOLIC BLOOD PRESSURE: 80 MMHG | SYSTOLIC BLOOD PRESSURE: 132 MMHG | HEIGHT: 72 IN | BODY MASS INDEX: 31.15 KG/M2 | TEMPERATURE: 97.8 F

## 2022-06-29 DIAGNOSIS — S09.90XA HEAD, FACE & NECK INJURY, INITIAL ENCOUNTER: Primary | ICD-10-CM

## 2022-06-29 DIAGNOSIS — S02.2XXA CLOSED FRACTURE OF NASAL BONE, INITIAL ENCOUNTER: ICD-10-CM

## 2022-06-29 DIAGNOSIS — S09.93XA HEAD, FACE & NECK INJURY, INITIAL ENCOUNTER: Primary | ICD-10-CM

## 2022-06-29 DIAGNOSIS — S19.9XXA HEAD, FACE & NECK INJURY, INITIAL ENCOUNTER: Primary | ICD-10-CM

## 2022-06-29 PROCEDURE — 70486 CT MAXILLOFACIAL W/O DYE: CPT

## 2022-06-29 PROCEDURE — 72125 CT NECK SPINE W/O DYE: CPT

## 2022-06-29 PROCEDURE — 70450 CT HEAD/BRAIN W/O DYE: CPT

## 2022-06-29 PROCEDURE — 99283 EMERGENCY DEPT VISIT LOW MDM: CPT

## 2022-06-29 RX ORDER — HYDROCODONE BITARTRATE AND ACETAMINOPHEN 5; 325 MG/1; MG/1
1 TABLET ORAL EVERY 6 HOURS PRN
Qty: 10 TABLET | Refills: 0 | Status: SHIPPED | OUTPATIENT
Start: 2022-06-29

## 2022-06-29 RX ORDER — ACETAMINOPHEN 500 MG
1000 TABLET ORAL ONCE
Status: COMPLETED | OUTPATIENT
Start: 2022-06-29 | End: 2022-06-29

## 2022-06-29 RX ADMIN — ACETAMINOPHEN 1000 MG: 500 TABLET ORAL at 04:42

## 2022-07-20 ENCOUNTER — APPOINTMENT (OUTPATIENT)
Dept: CT IMAGING | Facility: HOSPITAL | Age: 27
End: 2022-07-20

## 2022-07-20 ENCOUNTER — HOSPITAL ENCOUNTER (EMERGENCY)
Facility: HOSPITAL | Age: 27
Discharge: HOME | End: 2022-07-21
Attending: EMERGENCY MEDICINE | Admitting: EMERGENCY MEDICINE

## 2022-07-20 DIAGNOSIS — R30.0 DYSURIA: ICD-10-CM

## 2022-07-20 DIAGNOSIS — N30.00 ACUTE CYSTITIS WITHOUT HEMATURIA: ICD-10-CM

## 2022-07-20 DIAGNOSIS — R10.30 LOWER ABDOMINAL PAIN: Primary | ICD-10-CM

## 2022-07-20 LAB
ALBUMIN SERPL-MCNC: 4.8 G/DL (ref 3.5–5.2)
ALBUMIN/GLOB SERPL: 1.8 G/DL
ALP SERPL-CCNC: 67 U/L (ref 39–117)
ALT SERPL W P-5'-P-CCNC: 20 U/L (ref 1–41)
ANION GAP SERPL CALCULATED.3IONS-SCNC: 11.3 MMOL/L (ref 5–15)
AST SERPL-CCNC: 19 U/L (ref 1–40)
BACTERIA UR QL AUTO: NORMAL /HPF
BASOPHILS # BLD AUTO: 0.06 10*3/MM3 (ref 0–0.2)
BASOPHILS NFR BLD AUTO: 0.7 % (ref 0–1.5)
BILIRUB SERPL-MCNC: 0.4 MG/DL (ref 0–1.2)
BILIRUB UR QL STRIP: ABNORMAL
BUN SERPL-MCNC: 10 MG/DL (ref 6–20)
BUN/CREAT SERPL: 10.8 (ref 7–25)
CALCIUM SPEC-SCNC: 9.9 MG/DL (ref 8.6–10.5)
CHLORIDE SERPL-SCNC: 103 MMOL/L (ref 98–107)
CLARITY UR: CLEAR
CO2 SERPL-SCNC: 23.7 MMOL/L (ref 22–29)
COLOR UR: ABNORMAL
CREAT SERPL-MCNC: 0.93 MG/DL (ref 0.76–1.27)
DEPRECATED RDW RBC AUTO: 42 FL (ref 37–54)
EGFRCR SERPLBLD CKD-EPI 2021: 115.4 ML/MIN/1.73
EOSINOPHIL # BLD AUTO: 0.37 10*3/MM3 (ref 0–0.4)
EOSINOPHIL NFR BLD AUTO: 4.6 % (ref 0.3–6.2)
ERYTHROCYTE [DISTWIDTH] IN BLOOD BY AUTOMATED COUNT: 13.4 % (ref 12.3–15.4)
GLOBULIN UR ELPH-MCNC: 2.7 GM/DL
GLUCOSE SERPL-MCNC: 95 MG/DL (ref 65–99)
GLUCOSE UR STRIP-MCNC: NEGATIVE MG/DL
HCT VFR BLD AUTO: 46 % (ref 37.5–51)
HGB BLD-MCNC: 15.6 G/DL (ref 13–17.7)
HGB UR QL STRIP.AUTO: NEGATIVE
HYALINE CASTS UR QL AUTO: NORMAL /LPF
IMM GRANULOCYTES # BLD AUTO: 0.02 10*3/MM3 (ref 0–0.05)
IMM GRANULOCYTES NFR BLD AUTO: 0.2 % (ref 0–0.5)
KETONES UR QL STRIP: NEGATIVE
LEUKOCYTE ESTERASE UR QL STRIP.AUTO: NEGATIVE
LIPASE SERPL-CCNC: 22 U/L (ref 13–60)
LYMPHOCYTES # BLD AUTO: 2.11 10*3/MM3 (ref 0.7–3.1)
LYMPHOCYTES NFR BLD AUTO: 26 % (ref 19.6–45.3)
MAGNESIUM SERPL-MCNC: 2.2 MG/DL (ref 1.6–2.6)
MCH RBC QN AUTO: 29.2 PG (ref 26.6–33)
MCHC RBC AUTO-ENTMCNC: 33.9 G/DL (ref 31.5–35.7)
MCV RBC AUTO: 86 FL (ref 79–97)
MONOCYTES # BLD AUTO: 0.45 10*3/MM3 (ref 0.1–0.9)
MONOCYTES NFR BLD AUTO: 5.5 % (ref 5–12)
NEUTROPHILS NFR BLD AUTO: 5.12 10*3/MM3 (ref 1.7–7)
NEUTROPHILS NFR BLD AUTO: 63 % (ref 42.7–76)
NITRITE UR QL STRIP: POSITIVE
NRBC BLD AUTO-RTO: 0 /100 WBC (ref 0–0.2)
PH UR STRIP.AUTO: 6 [PH] (ref 5–8)
PLATELET # BLD AUTO: 234 10*3/MM3 (ref 140–450)
PMV BLD AUTO: 9.3 FL (ref 6–12)
POTASSIUM SERPL-SCNC: 4.3 MMOL/L (ref 3.5–5.2)
PROT SERPL-MCNC: 7.5 G/DL (ref 6–8.5)
PROT UR QL STRIP: NEGATIVE
RBC # BLD AUTO: 5.35 10*6/MM3 (ref 4.14–5.8)
RBC # UR STRIP: NORMAL /HPF
REF LAB TEST METHOD: NORMAL
SODIUM SERPL-SCNC: 138 MMOL/L (ref 136–145)
SP GR UR STRIP: <=1.005 (ref 1–1.03)
SQUAMOUS #/AREA URNS HPF: NORMAL /HPF
UROBILINOGEN UR QL STRIP: ABNORMAL
WBC # UR STRIP: NORMAL /HPF
WBC NRBC COR # BLD: 8.13 10*3/MM3 (ref 3.4–10.8)

## 2022-07-20 PROCEDURE — 96375 TX/PRO/DX INJ NEW DRUG ADDON: CPT

## 2022-07-20 PROCEDURE — 25010000002 KETOROLAC TROMETHAMINE PER 15 MG: Performed by: NURSE PRACTITIONER

## 2022-07-20 PROCEDURE — 99283 EMERGENCY DEPT VISIT LOW MDM: CPT

## 2022-07-20 PROCEDURE — 83690 ASSAY OF LIPASE: CPT | Performed by: NURSE PRACTITIONER

## 2022-07-20 PROCEDURE — 83735 ASSAY OF MAGNESIUM: CPT | Performed by: NURSE PRACTITIONER

## 2022-07-20 PROCEDURE — 25010000002 IOPAMIDOL 61 % SOLUTION: Performed by: EMERGENCY MEDICINE

## 2022-07-20 PROCEDURE — 81001 URINALYSIS AUTO W/SCOPE: CPT | Performed by: NURSE PRACTITIONER

## 2022-07-20 PROCEDURE — 25010000002 MORPHINE PER 10 MG: Performed by: NURSE PRACTITIONER

## 2022-07-20 PROCEDURE — 25010000002 ONDANSETRON PER 1 MG: Performed by: NURSE PRACTITIONER

## 2022-07-20 PROCEDURE — 96374 THER/PROPH/DIAG INJ IV PUSH: CPT

## 2022-07-20 PROCEDURE — 74177 CT ABD & PELVIS W/CONTRAST: CPT

## 2022-07-20 PROCEDURE — 80053 COMPREHEN METABOLIC PANEL: CPT | Performed by: NURSE PRACTITIONER

## 2022-07-20 PROCEDURE — 85025 COMPLETE CBC W/AUTO DIFF WBC: CPT | Performed by: NURSE PRACTITIONER

## 2022-07-20 RX ORDER — ONDANSETRON 2 MG/ML
4 INJECTION INTRAMUSCULAR; INTRAVENOUS ONCE
Status: COMPLETED | OUTPATIENT
Start: 2022-07-20 | End: 2022-07-20

## 2022-07-20 RX ORDER — MORPHINE SULFATE 2 MG/ML
2 INJECTION, SOLUTION INTRAMUSCULAR; INTRAVENOUS ONCE
Status: COMPLETED | OUTPATIENT
Start: 2022-07-20 | End: 2022-07-20

## 2022-07-20 RX ORDER — PHENAZOPYRIDINE HYDROCHLORIDE 100 MG/1
200 TABLET, FILM COATED ORAL ONCE
Status: COMPLETED | OUTPATIENT
Start: 2022-07-20 | End: 2022-07-20

## 2022-07-20 RX ORDER — SODIUM CHLORIDE 0.9 % (FLUSH) 0.9 %
10 SYRINGE (ML) INJECTION AS NEEDED
Status: DISCONTINUED | OUTPATIENT
Start: 2022-07-20 | End: 2022-07-21 | Stop reason: HOSPADM

## 2022-07-20 RX ORDER — KETOROLAC TROMETHAMINE 30 MG/ML
15 INJECTION, SOLUTION INTRAMUSCULAR; INTRAVENOUS ONCE
Status: COMPLETED | OUTPATIENT
Start: 2022-07-20 | End: 2022-07-20

## 2022-07-20 RX ADMIN — MORPHINE SULFATE 2 MG: 2 INJECTION, SOLUTION INTRAMUSCULAR; INTRAVENOUS at 23:36

## 2022-07-20 RX ADMIN — IOPAMIDOL 100 ML: 612 INJECTION, SOLUTION INTRAVENOUS at 22:38

## 2022-07-20 RX ADMIN — ONDANSETRON 4 MG: 2 INJECTION INTRAMUSCULAR; INTRAVENOUS at 22:11

## 2022-07-20 RX ADMIN — KETOROLAC TROMETHAMINE 15 MG: 30 INJECTION, SOLUTION INTRAMUSCULAR; INTRAVENOUS at 22:11

## 2022-07-20 RX ADMIN — SODIUM CHLORIDE 1000 ML: 9 INJECTION, SOLUTION INTRAVENOUS at 22:10

## 2022-07-20 RX ADMIN — PHENAZOPYRIDINE HYDROCHLORIDE 200 MG: 100 TABLET ORAL at 23:35

## 2022-07-21 VITALS
WEIGHT: 220 LBS | DIASTOLIC BLOOD PRESSURE: 80 MMHG | RESPIRATION RATE: 18 BRPM | HEART RATE: 106 BPM | OXYGEN SATURATION: 97 % | TEMPERATURE: 98.2 F | BODY MASS INDEX: 29.8 KG/M2 | HEIGHT: 72 IN | SYSTOLIC BLOOD PRESSURE: 123 MMHG

## 2022-07-21 RX ORDER — CEFDINIR 300 MG/1
300 CAPSULE ORAL 2 TIMES DAILY
Qty: 20 CAPSULE | Refills: 0 | Status: SHIPPED | OUTPATIENT
Start: 2022-07-21

## 2022-07-21 RX ORDER — PHENAZOPYRIDINE HYDROCHLORIDE 200 MG/1
200 TABLET, FILM COATED ORAL 3 TIMES DAILY PRN
Qty: 6 TABLET | Refills: 0 | Status: SHIPPED | OUTPATIENT
Start: 2022-07-21

## 2022-07-21 NOTE — ED PROVIDER NOTES
Subjective   27-year-old male presents to the ED today for complaint of mid low abdominal pain and left flank pain.  He says that this is been ongoing for a while now and he has been blaming it on being dehydrated or not drinking enough fluids.  He has pain with urination and after urination.  States that his mom told him he had some difficulty with his kidneys as a child but other than that he has no history.  He denies fevers or chills.  No nausea, vomiting or diarrhea.  No other symptoms today.          Review of Systems   Constitutional: Negative.    HENT: Negative.    Eyes: Negative.    Respiratory: Negative.    Cardiovascular: Negative.    Gastrointestinal: Positive for abdominal pain.   Endocrine: Negative.    Genitourinary: Positive for difficulty urinating, dysuria and flank pain.   Skin: Negative.    Allergic/Immunologic: Negative.    Neurological: Negative.    Hematological: Negative.    Psychiatric/Behavioral: Negative.        Past Medical History:   Diagnosis Date   • Acute appendicitis with localized peritonitis, without perforation, abscess, or gangrene 12/31/2018    Added automatically from request for surgery [id number]       Allergies   Allergen Reactions   • Fluoxetine Mental Status Change     Agitation and lethargy.       Past Surgical History:   Procedure Laterality Date   • APPENDECTOMY N/A 12/31/2018    Procedure: APPENDECTOMY LAPAROSCOPIC;  Surgeon: Josesito Herron MD;  Location: [facility] OR;  Service: General   • NO PAST SURGERIES         Family History   Problem Relation Age of Onset   • No Known Problems Mother    • Hypertension Father        Social History     Socioeconomic History   • Marital status: Single   Tobacco Use   • Smoking status: Current Every Day Smoker     Packs/day: 1.00     Types: Cigarettes   • Smokeless tobacco: Never Used   Vaping Use   • Vaping Use: Never used   Substance and Sexual Activity   • Alcohol use: No   • Drug use: No   • Sexual activity: Defer            Objective   Physical Exam  Vitals and nursing note reviewed. Exam conducted with a chaperone present.   Constitutional:       Appearance: Normal appearance. He is normal weight.   HENT:      Head: Normocephalic and atraumatic.      Nose: Nose normal.      Mouth/Throat:      Mouth: Mucous membranes are moist.      Pharynx: Oropharynx is clear.   Eyes:      Extraocular Movements: Extraocular movements intact.      Conjunctiva/sclera: Conjunctivae normal.      Pupils: Pupils are equal, round, and reactive to light.   Cardiovascular:      Rate and Rhythm: Regular rhythm. Tachycardia present.      Pulses: Normal pulses.      Heart sounds: Normal heart sounds.   Pulmonary:      Effort: Pulmonary effort is normal.      Breath sounds: Normal breath sounds.   Abdominal:      General: Bowel sounds are normal.      Tenderness: There is abdominal tenderness.   Musculoskeletal:         General: Normal range of motion.   Skin:     General: Skin is warm and dry.      Capillary Refill: Capillary refill takes less than 2 seconds.   Neurological:      Mental Status: He is alert and oriented to person, place, and time.   Psychiatric:         Mood and Affect: Mood normal.         Behavior: Behavior normal.         Thought Content: Thought content normal.         Judgment: Judgment normal.         Procedures           ED Course                                           MDM    Final diagnoses:   Lower abdominal pain   Dysuria   Acute cystitis without hematuria       ED Disposition  ED Disposition     ED Disposition   Discharge    Condition   Stable    Comment   --             No follow-up provider specified.       Medication List      New Prescriptions    cefdinir 300 MG capsule  Commonly known as: OMNICEF  Take 1 capsule by mouth 2 (Two) Times a Day.     phenazopyridine 200 MG tablet  Commonly known as: PYRIDIUM  Take 1 tablet by mouth 3 (Three) Times a Day As Needed for Bladder Spasms.           Where to Get Your Medications       These medications were sent to [facility] [address] [place] [address] - [address] [facility] - [phone]  - [phone]   [address] [facility] [address]    Phone: [phone]   · cefdinir 300 MG capsule  · phenazopyridine 200 MG tablet          Alanis López, APRN  07/21/22 0006

## 2023-09-21 ENCOUNTER — OFFICE VISIT (OUTPATIENT)
Dept: PSYCHIATRY | Facility: CLINIC | Age: 28
End: 2023-09-21
Payer: COMMERCIAL

## 2023-09-21 VITALS
WEIGHT: 206 LBS | BODY MASS INDEX: 27.9 KG/M2 | HEART RATE: 84 BPM | SYSTOLIC BLOOD PRESSURE: 122 MMHG | DIASTOLIC BLOOD PRESSURE: 86 MMHG | HEIGHT: 72 IN | OXYGEN SATURATION: 98 %

## 2023-09-21 DIAGNOSIS — F10.21 ALCOHOL USE DISORDER, SEVERE, IN SUSTAINED REMISSION: ICD-10-CM

## 2023-09-21 DIAGNOSIS — F13.21 SEDATIVE, HYPNOTIC OR ANXIOLYTIC USE DISORDER, SEVERE, IN SUSTAINED REMISSION: ICD-10-CM

## 2023-09-21 DIAGNOSIS — F41.1 GENERALIZED ANXIETY DISORDER: ICD-10-CM

## 2023-09-21 DIAGNOSIS — F33.1 MAJOR DEPRESSIVE DISORDER, RECURRENT EPISODE, MODERATE: ICD-10-CM

## 2023-09-21 DIAGNOSIS — F12.20 CANNABIS USE DISORDER, SEVERE, DEPENDENCE: Primary | ICD-10-CM

## 2023-09-21 RX ORDER — ARIPIPRAZOLE 5 MG/1
5 TABLET ORAL DAILY
COMMUNITY

## 2023-09-21 RX ORDER — VENLAFAXINE HYDROCHLORIDE 150 MG/1
150 CAPSULE, EXTENDED RELEASE ORAL DAILY
COMMUNITY
Start: 2023-09-02

## 2023-09-21 NOTE — PROGRESS NOTES
New Patient Office Visit        Patient Name: Dhiraj Stokes  : 1995   MRN: [id number]     Referring Provider: Dodie Gorman APRN    Chief Complaint: Substance use    History of Present Illness:   Dhiraj Stokes is a 28 y.o. male who is here today for court ordered initial evaluation with provider related to substance use. Initial substance at age 11 with marijuana and use escalated to daily by teen.  At peak was smoking about 1/2/week.  Currently smoking about 1 joint weekly with last intake 2 days ago.  At age 15 use of alcohol began and escalated over time to drinking about a 30 pack of beer daily.  Unfortunately his father struggled with alcohol use and took his own life about 2 years ago.  The patient stopped cold turkey at that time and denies any further intake in the last 2 years.  From about age [age] had spent some sporadic use of cocaine and Percocet.  Denies any problematic intake with last use of each over 7 years ago.  Recreational use of gabapentin began around age 23.  At peak was taking five 800 mg capsules daily.  Quit cold turkey about 2 years ago and denies any further intake.  Cravings for marijuana are daily and typically triggered by stress and insomnia denies any previous KIMBERLY treatment or meetings.       Currently lives in [place] with his long-term girlfriend.  Has 2 children (13yo girl, 1 yo boy) that are in the custody of his girlfriend's mother. Has open [employer] case plan. Identifies sober support system of his mother, sister, and brother in law.  Not currently employed but is looking. License is suspended and family helps with transportation.  Current legal issues include aforementioned [employer] case and has been Mandeep's Lawed by his mother.  States his mother thought his KIMBERLY was more involved than it is.  Now that the case has started they have to proceed with it.  Next court date is 10/26/2023.   is Oliva Goff. Girlfriend is Mandeep's Lawed as well. Motivated  "to change \" for my kids”.     Has psych history of KAMRAN, MDD, PTSD. Today reports symptoms of overall anxiety.  Does report a lot of this is situational.  Has trouble with constant worry, restlessness, feels he cannot relax, poor concentration, difficulty falling asleep and staying asleep, irritability, depressed mood, and psychomotor retardation worsening over the last few years.  Currently prescribed Effexor 150 mg daily and Abilify 5 mg daily.  Does not feel the Effexor is helpful as he has been on it for quite some time and has a lot of residual symptoms.  Recently started on Abilify 5 mg that has been helpful.  Denies prior psychiatric hospitalizations.  History of SI without a plan about a year ago.  Denies current SI/HI, AVH. +FH of alcohol use disorder in multiple family members.  Mother and sister with ADHD.  Denies significant PMH includes.  Has not had hep C/HIV testing and declines.  No history of seizures or DTs.  Does not have a current PCP.    Triggers: Stress, insomnia    Cravings: Daily for marijuana    Relapse Prevention: IOP discussed.  TCM and peer support. Recovery meetings encouraged.    Urine Drug Screen (today's visit) discussed: Ordered    UDS Confirmation: LAITH signed for UDS per  and results requested    FEMI (PDMP) Reviewed for Current/Active Medications: no active medications      DSM 5 Substance Use Disorder Checklist     Diagnostic Criteria   (Substance use disorder requires at least 2 criteria be met within 12 month period)   Meets Criteria          Yes / No  Notes/Supporting Information    Substance often taken in larger amounts or over a longer period than intended.  yes Alcohol, marijuana, gabapentin   2.  There is a persistent desire or unsuccessful effort to cut        down or control th substance use.  yes Alcohol, marijuana, gabapentin   3.  A great deal of time is spent in activities necessary to        obtain the substance, use the substance, or recover        " from its effects.  yes Alcohol, marijuana, gabapentin   4.  Craving or a strong desire to use the substance.  yes Alcohol, marijuana   5.  Recurrent substance use resulting in failure to fulfill        major role obligations at work, school, or home.  yes Alcohol, gabapentin   6.  Continued substance use despite having persistent or         recurrent social or interpersonal problems caused or         exacerbated by the effects of the substance.  yes Alcohol, marijuana, gabapentin   7.   Important social, occupational or recreational activities        are given up or reduced because of substance use.  yes Alcohol only    8.   Recurrent substance use in situations in which it is        physically hazardous.  yes Alcohol, gabapentin   9.  Continued use despite knowledge of having a         persistent or recurrent physical or psychological         problem that is likely to have been caused or        exacerbated by the substance.  yes Alcohol, gabapentin   10. * Tolerance, as defined by either of the following:          a. A need for markedly increased amounts of the              Substance to achieve intoxication or desired effect.          b. Markedly diminished effect with continued use of              The same substance.  yes Alcohol, marijuana, gabapentin   11.  * Withdrawal, as manifested by either of the following:         a. The characteristic withdrawal for the substance.          b. The same (or closely related) substance is taken to               Relieve or avoid withdrawal symptoms.  yes Alcohol (irritability, insomnia)  Marijuana(insomnia, poor concentration)  Gabapentin (headache, irritability, dizziness, insomnia)   **This criterion is not considered to be met for those individuals taking prescriptions opiates solely under medical supervision. **   Severity: Mild: 2-3 symptoms, Moderate: 4-5 symptoms, Severe: 6 or more symptoms.      Alcohol 11/11, marijuana 7/11, gabapentin 9/11    Past Surgical  History:  Past Surgical History:   Procedure Laterality Date    APPENDECTOMY N/A 12/31/2018    Procedure: APPENDECTOMY LAPAROSCOPIC;  Surgeon: Josesito Herron MD;  Location: [facility];  Service: General    NO PAST SURGERIES         Problem List:  Patient Active Problem List   Diagnosis    Abdominal pain    Right lower quadrant abdominal pain       Allergy:   Allergies   Allergen Reactions    Fluoxetine Mental Status Change     Agitation and lethargy.        Current Medications:   Current Outpatient Medications   Medication Sig Dispense Refill    ARIPiprazole (ABILIFY) 5 MG tablet Take 1 tablet by mouth Daily.      venlafaxine XR (EFFEXOR-XR) 150 MG 24 hr capsule Take 1 capsule by mouth Daily. with food       No current facility-administered medications for this visit.       Past Medical History:  Past Medical History:   Diagnosis Date    Acute appendicitis with localized peritonitis, without perforation, abscess, or gangrene 12/31/2018    Added automatically from request for surgery [id number]       Social History:  Social History     Socioeconomic History    Marital status: Single   Tobacco Use    Smoking status: Every Day     Packs/day: 1.00     Types: Cigarettes     Passive exposure: Current    Smokeless tobacco: Never   Vaping Use    Vaping Use: Never used   Substance and Sexual Activity    Alcohol use: Not Currently    Drug use: Never    Sexual activity: Defer       Family History:  Family History   Problem Relation Age of Onset    No Known Problems Mother     Hypertension Father          Subjective      Review of Systems:   Review of Systems   Constitutional:  Positive for fatigue. Negative for chills and fever.   Respiratory:  Negative for shortness of breath.    Cardiovascular:  Negative for chest pain.   Gastrointestinal:  Negative for abdominal pain.   Skin:  Negative for skin lesions.   Neurological:  Negative for seizures and confusion.   Psychiatric/Behavioral:  Positive for decreased concentration, sleep  disturbance, positive for hyperactivity, depressed mood and stress. Negative for hallucinations and suicidal ideas. The patient is nervous/anxious.      PHQ-9 Depression Screening  Little interest or pleasure in doing things? 1-->several days   Feeling down, depressed, or hopeless? 2-->more than half the days   Trouble falling or staying asleep, or sleeping too much? 1-->several days   Feeling tired or having little energy? 2-->more than half the days   Poor appetite or overeating? 0-->not at all   Feeling bad about yourself - or that you are a failure or have let yourself or your family down? 1-->several days   Trouble concentrating on things, such as reading the newspaper or watching television? 3-->nearly every day   Moving or speaking so slowly that other people could have noticed? Or the opposite - being so fidgety or restless that you have been moving around a lot more than usual? 2-->more than half the days   Thoughts that you would be better off dead, or of hurting yourself in some way? 0-->not at all   PHQ-9 Total Score 12   If you checked off any problems, how difficult have these problems made it for you to do your work, take care of things at home, or get along with other people? very difficult        KAMRAN-7 Score:   Feeling nervous, anxious or on edge: Nearly every day  Not being able to stop or control worrying: More than half the days  Worrying too much about different things: More than half the days  Trouble Relaxing: More than half the days  Being so restless that it is hard to sit still: Nearly every day  Feeling afraid as if something awful might happen: Several days  Becoming easily annoyed or irritable: Nearly every day  KAMRAN 7 Total Score: 16  If you checked any problems, how difficult have these problems made it for you to do your work, take care of things at home, or get along with other people: Very difficult    Patient History:   The following portions of the patient's history were reviewed  "and updated as appropriate: allergies, current medications, past family history, past medical history, past social history, past surgical history and problem list.     Social:  Social History     Socioeconomic History    Marital status: Single   Tobacco Use    Smoking status: Every Day     Packs/day: 1.00     Types: Cigarettes     Passive exposure: Current    Smokeless tobacco: Never   Vaping Use    Vaping Use: Never used   Substance and Sexual Activity    Alcohol use: Not Currently    Drug use: Never    Sexual activity: Defer       Medications:     Current Outpatient Medications:     ARIPiprazole (ABILIFY) 5 MG tablet, Take 1 tablet by mouth Daily., Disp: , Rfl:     venlafaxine XR (EFFEXOR-XR) 150 MG 24 hr capsule, Take 1 capsule by mouth Daily. with food, Disp: , Rfl:     Objective     Physical Exam  Vitals reviewed.   Constitutional:       General: He is not in acute distress.     Appearance: He is well-developed. He is not ill-appearing.   Pulmonary:      Effort: No respiratory distress.   Neurological:      Mental Status: He is alert and oriented to person, place, and time.      Gait: Gait normal.   Psychiatric:         Attention and Perception: Attention normal.         Mood and Affect: Mood and affect normal.         Speech: Speech normal.         Behavior: Behavior normal. Behavior is cooperative.         Thought Content: Thought content is not paranoid or delusional. Thought content does not include homicidal or suicidal ideation. Thought content does not include homicidal or suicidal plan.         Cognition and Memory: Cognition and memory normal.       Vital Signs:   Vitals:    09/21/23 0847   BP: 122/86   Pulse: 84   SpO2: 98%   Weight: 93.4 kg (206 lb)   Height: 182.9 cm (72\")     Body mass index is 27.94 kg/m².     Mental Status Exam:   Hygiene:   good  Cooperation:  Cooperative  Eye Contact:  Good  Psychomotor Behavior:  Appropriate  Affect:  Full range  Mood: anxious  Speech:  Normal  Thought " Process:  Goal directed  Thought Content:  Normal  Suicidal:  None  Homicidal:  None  Hallucinations:  None  Delusion:  None  Memory:  Intact  Orientation:  Person, Place, Time, and Situation  Reliability:  good  Insight:  Fair  Judgement:  Fair  Impulse Control:  Fair    Assessment / Plan      -Discussed treatment options with patient. Declines residential/detox.   -LAITH signed for [facility] and will obtain copies of recent UDS  -Patient is good candidate for IOP. Significant other has been Mandeep's Lawed as well. Will need to complete both assessments prior to establishing treatment plan. Will contact patient with next steps.  -Advisement to take part in and remain active in 12 Step Recovery Meetings, IOP, and/or 1:1 therapy/counseling and to establish/maintain an active relationship with a recovery sponsor. Recovery meeting list for [place] given in office.   -Continue Effexor 150mg daily for KAMRAN and MDD  -Continue Abilify 5mg daily for MDD  -Declines Narcan  -LAITH signed for TCM and peer support and referral placed.  -LAITH signed for   -Continued monitoring for illicit substances for patient safety, medication compliance and future care.      Assessment & Plan   Problems Addressed this Visit    None  Visit Diagnoses       Cannabis use disorder, severe, dependence    -  Primary    Alcohol use disorder, severe, in sustained remission        Sedative, hypnotic or anxiolytic use disorder, severe, in sustained remission        Generalized anxiety disorder        Relevant Medications    venlafaxine XR (EFFEXOR-XR) 150 MG 24 hr capsule    ARIPiprazole (ABILIFY) 5 MG tablet    Major depressive disorder, recurrent episode, moderate        Relevant Medications    venlafaxine XR (EFFEXOR-XR) 150 MG 24 hr capsule    ARIPiprazole (ABILIFY) 5 MG tablet          Diagnoses         Codes Comments    Cannabis use disorder, severe, dependence    -  Primary ICD-10-CM: F12.20  ICD-9-CM: 304.30     Alcohol use disorder, severe, in  sustained remission     ICD-10-CM: F10.21  ICD-9-CM: 303.93     Sedative, hypnotic or anxiolytic use disorder, severe, in sustained remission     ICD-10-CM: F13.21  ICD-9-CM: 304.13     Generalized anxiety disorder     ICD-10-CM: F41.1  ICD-9-CM: 300.02     Major depressive disorder, recurrent episode, moderate     ICD-10-CM: F33.1  ICD-9-CM: 296.32             Visit Diagnoses:    ICD-10-CM ICD-9-CM   1. Cannabis use disorder, severe, dependence  F12.20 304.30   2. Alcohol use disorder, severe, in sustained remission  F10.21 303.93   3. Sedative, hypnotic or anxiolytic use disorder, severe, in sustained remission  F13.21 304.13   4. Generalized anxiety disorder  F41.1 300.02   5. Major depressive disorder, recurrent episode, moderate  F33.1 296.32       PLAN:  Safety: No acute safety concerns  Risk Assessment: Risk of self-harm acutely is low. Risk of self-harm chronically is also low, but could be further elevated in the event of treatment noncompliance and/or AODA.    TREATMENT PLAN: Continue supportive psychotherapy efforts and medications as indicated. Treatment and medication options discussed during today's visit. Patient acknowledged and verbally consented to continue with current treatment plan and was educated on the importance of compliance with treatment and follow-up appointments.    GOALS:  Short Term Goals: Patient will be compliant with medication, and patient will have no significant medication related side effects.  Patient will be engaged in psychotherapy as indicated.  Patient will report subjective improvement of symptoms.  Long term goals: To stabilize mood and treat/improve subjective symptoms, the patient will stay out of the hospital, the patient will be at an optimal level of functioning, and the patient will take all medications as prescribed.  The patient/guardian verbalized understanding and agreement with goals that were mutually set.    MEDICATION ISSUES:  FEMI reviewed as  expected.  Discussed medication options and treatment plan of prescribed medication as well as the risks, benefits, and side effects including potential falls, possible impaired driving and metabolic adversities among others. Patient is agreeable to call the office with any worsening of symptoms or onset of side effects. Patient is agreeable to call 911 or go to the nearest ER should he/she begin having SI/HI. No medication side effects or related complaints today.       TOBACCO USE:  Current every day smoker less than 3 minutes spent counseling Will try to cut down    I advised Dhiraj Stokes of the risks of tobacco use.     MEDS ORDERED DURING VISIT:  No orders of the defined types were placed in this encounter.      No follow-ups on file.           This document has been electronically signed by YONI Gordon  September 22, 2023 12:30 EDT      Part of this note may be an electronic transcription/translation of spoken language to printed text using the Dragon Dictation System.

## 2023-09-22 ENCOUNTER — TELEPHONE (OUTPATIENT)
Dept: PSYCHIATRY | Facility: CLINIC | Age: 28
End: 2023-09-22
Payer: COMMERCIAL

## 2023-09-22 NOTE — TELEPHONE ENCOUNTER
Please call and let him know I have discussed with treatment team and we recommend IOP. He has an appt with Per Noguera 10/2 at 12:00 to further discuss the program. Please let us know if he needs to change or cancel appt.

## 2023-12-11 ENCOUNTER — PRIOR AUTHORIZATION (OUTPATIENT)
Dept: BEHAVIORAL HEALTH | Facility: CLINIC | Age: 28
End: 2023-12-11
Payer: COMMERCIAL

## 2023-12-11 ENCOUNTER — OFFICE VISIT (OUTPATIENT)
Dept: BEHAVIORAL HEALTH | Facility: CLINIC | Age: 28
End: 2023-12-11
Payer: COMMERCIAL

## 2023-12-11 ENCOUNTER — TELEPHONE (OUTPATIENT)
Dept: BEHAVIORAL HEALTH | Facility: CLINIC | Age: 28
End: 2023-12-11

## 2023-12-11 VITALS — BODY MASS INDEX: 29.66 KG/M2 | WEIGHT: 219 LBS | HEIGHT: 72 IN

## 2023-12-11 DIAGNOSIS — F90.2 ATTENTION DEFICIT HYPERACTIVITY DISORDER, COMBINED TYPE: Primary | ICD-10-CM

## 2023-12-11 DIAGNOSIS — F43.10 POST TRAUMATIC STRESS DISORDER (PTSD): ICD-10-CM

## 2023-12-11 DIAGNOSIS — Z51.81 ENCOUNTER FOR THERAPEUTIC DRUG MONITORING: ICD-10-CM

## 2023-12-11 DIAGNOSIS — F33.1 MODERATE EPISODE OF RECURRENT MAJOR DEPRESSIVE DISORDER: ICD-10-CM

## 2023-12-11 DIAGNOSIS — F41.1 GENERALIZED ANXIETY DISORDER: ICD-10-CM

## 2023-12-11 PROCEDURE — 1160F RVW MEDS BY RX/DR IN RCRD: CPT

## 2023-12-11 PROCEDURE — 1159F MED LIST DOCD IN RCRD: CPT

## 2023-12-11 PROCEDURE — 90792 PSYCH DIAG EVAL W/MED SRVCS: CPT

## 2023-12-11 RX ORDER — ARIPIPRAZOLE 5 MG/1
10 TABLET ORAL DAILY
Qty: 30 TABLET | Refills: 1 | Status: SHIPPED | OUTPATIENT
Start: 2023-12-11

## 2023-12-11 RX ORDER — DEXTROAMPHETAMINE SACCHARATE, AMPHETAMINE ASPARTATE, DEXTROAMPHETAMINE SULFATE AND AMPHETAMINE SULFATE 3.125; 3.125; 3.125; 3.125 MG/1; MG/1; MG/1; MG/1
12.5 TABLET ORAL 2 TIMES DAILY
Qty: 60 TABLET | Refills: 0 | Status: SHIPPED | OUTPATIENT
Start: 2023-12-11

## 2023-12-11 RX ORDER — VENLAFAXINE HYDROCHLORIDE 75 MG/1
150 CAPSULE, EXTENDED RELEASE ORAL DAILY
Qty: 30 CAPSULE | Refills: 1 | COMMUNITY
Start: 2023-12-11

## 2023-12-11 NOTE — TELEPHONE ENCOUNTER
PA for ARIPiprazole 5MG tablets (Abilify) sent to insurance. Key: [id number]    PA for Amphetamine-Dextroamphetamine 12.5MG tablets (Adderall) sent to insurance. Key: [id number]    Awaiting response.

## 2023-12-11 NOTE — PROGRESS NOTES
New Patient Office Visit    Patient Name: Dhiraj Stokes  : 1995   MRN: [id number]   Care Team: Patient Care Team:  Dodie Gorman APRN as PCP - General (Nurse Practitioner)  Josesito Herron MD as Consulting Physician (General Surgery)  Tracy Horn APRN as Nurse Practitioner (Behavioral Health)         Chief Complaint:    Chief Complaint   Patient presents with    ADHD    Anxiety    Depression    PTSD    Med Management       History of Present Illness: Dhiraj Stokes is a 28 y.o. male who is here today to establish care. Patient presents requesting an ADHD evaluation as he feels that he might have it and might have been misdiagnosed by his previous provider. He reports that he struggles with focus and concentration. He feels that he is always fidgeting and can't remember anything. He finds himself jumping around from task to task before completing anything. He endorses some anxiety and irritability and feels that the Abilify has helped with that. He currently takes Effexor but feels that it doesn't help with anything. He denies SI/HI today.      The following portion of the patient's history were reviewed and updated appropriately: allergies, current and past medications, family history, medical history and social history.  Subjective   Review of Systems:    Review of Systems   Psychiatric/Behavioral:  Positive for agitation, decreased concentration, depressed mood and stress. The patient is nervous/anxious.    All other systems reviewed and are negative.      PSYCHIATRIC HISTORY   Current Psychiatric Medications:  Effexor (1 year)   Abilify   Prior Psychiatric Medications:  None   Currently in Counseling or Therapy:  None   Prior Psychiatric Outpatient Care:  [facility]   Prior Psychiatric Hospitalizations:  None   Previous Suicide Attempts:  None   Previous Self-Harming Behavior:  None   History of Seizures or TBI:  None   Abuse History:  Verbal: yes  Emotional:  "yes  Mental: yes  Physical: yes  Sexual: no  Rape: no        Family Psychiatric History:  ADHD, Depression, Anxiety   Any family history of suicide attempts:  Father       Substance Abuse History:  Alcohol: no  Smoking/Cigarettes: yes  Vaping: no  Smokeless Tobacco: no  Illicit Substances: no  Prescription Medication Misuse: no    Social History:  Born: [place]   Raised: [place], [place]   Currently resides in [place], [place]   Marriage status: single   Children: one   Lives with: The patient's currently household consists of the patient, mother and girlfriend   Highest Level of Education: High School   Employment: not yet   History: none   Legal History, Arrests, or Incarcerations: No current legal charges pending.  Patient's Support Network Includes:   family           Current Medications:   Current Outpatient Medications   Medication Sig Dispense Refill    ARIPiprazole (ABILIFY) 5 MG tablet Take 2 tablets by mouth Daily. 30 tablet 1    amphetamine-dextroamphetamine (Adderall) 12.5 MG tablet Take 1 tablet by mouth 2 (Two) Times a Day. 60 tablet 0    venlafaxine XR (Effexor XR) 75 MG 24 hr capsule Take 2 capsules by mouth Daily. 30 capsule 1     No current facility-administered medications for this visit.       Mental Status Exam:   Hygiene:   good  Cooperation:  Cooperative  Eye Contact:  Good  Psychomotor Behavior:  Appropriate  Affect:  Appropriate  Mood: anxious, irritable, and fluctates  Speech:  Normal  Thought Process:  Goal directed and Linear  Thought Content:  Normal and Mood congruent  Suicidal:  None  Homicidal:  None  Hallucinations:  None  Delusion:  None  Memory:  Intact  Orientation:  Person, Place, Time, and Situation  Reliability:  good  Insight:  Good  Judgement:  Good  Impulse Control:  Good  Physical/Medical Issues:  No      Objective   Vital Signs:   Ht 182.9 cm (72\")   Wt 99.3 kg (219 lb)   BMI 29.70 kg/m²       Assessment / Plan    Diagnoses and all orders for this " visit:    1. Attention deficit hyperactivity disorder, combined type (Primary)  -     amphetamine-dextroamphetamine (Adderall) 12.5 MG tablet; Take 1 tablet by mouth 2 (Two) Times a Day.  Dispense: 60 tablet; Refill: 0  -     Compliance Drug Analysis, Ur - Urine, Clean Catch    2. Generalized anxiety disorder  -     ARIPiprazole (ABILIFY) 5 MG tablet; Take 2 tablets by mouth Daily.  Dispense: 30 tablet; Refill: 1  -     venlafaxine XR (Effexor XR) 75 MG 24 hr capsule; Take 2 capsules by mouth Daily.  Dispense: 30 capsule; Refill: 1    3. Moderate episode of recurrent major depressive disorder  -     ARIPiprazole (ABILIFY) 5 MG tablet; Take 2 tablets by mouth Daily.  Dispense: 30 tablet; Refill: 1  -     venlafaxine XR (Effexor XR) 75 MG 24 hr capsule; Take 2 capsules by mouth Daily.  Dispense: 30 capsule; Refill: 1    4. Post traumatic stress disorder (PTSD)  -     ARIPiprazole (ABILIFY) 5 MG tablet; Take 2 tablets by mouth Daily.  Dispense: 30 tablet; Refill: 1    5. Encounter for therapeutic drug monitoring  -     Compliance Drug Analysis, Ur - Urine, Clean Catch     Will start Adderall BID and start to wean off Effexor by decreasing current dose in 1/2. Will continue Abilify as ordered. Obtained yearly urine drug screen and controlled substance agreement signed.     A psychological evaluation was conducted in order to assess past and current level of functioning. Areas assessed included, but were not limited to: perception of social support, perception of ability to face and deal with challenges in life (positive functioning), anxiety symptoms, depressive symptoms, perspective on beliefs/belief system, coping skills for stress, intelligence level,  Therapeutic rapport was established. Interventions conducted today were geared towards incorporating medication management along with support for continued therapy. Education was also provided as to the med management with this provider and what to expect in subsequent  sessions.      We discussed risks, benefits, goals and side effects of the above medication and the patient was agreeable with the plan. Patient was educated on the importance of compliance with treatment and follow-up appointments. Patient is aware to contact the [facility] with any worsening of symptoms. To call for questions or concerns and return early if necessary. Patent is agreeable to go to the Emergency Department or call 911 should they begin SI/HI.    PHQ-2/PHQ-9: Depression Screening  Little Interest or Pleasure in Doing Things: 3-->nearly every day  Feeling Down, Depressed or Hopeless: 2-->more than half the days  PHQ-2 Total Score: 5  Trouble Falling or Staying Asleep, or Sleeping Too Much: 3-->nearly every day  Feeling Tired or Having Little Energy: 3-->nearly every day  Poor Appetite or Overeatin-->more than half the days  Feeling Bad about Yourself - or that You are a Failure or Have Let Yourself or Your Family Down: 3-->nearly every day  Trouble Concentrating on Things, Such as Reading the Newspaper or Watching Television: 3-->nearly every day  Moving or Speaking So Slowly that Other People Could Have Noticed? Or the Opposite - Being So Fidgety: 2-->more than half the days  Thoughts that You Would be Better Off Dead or of Hurting Yourself in Some Way: 0-->not at all  PHQ-9: Brief Depression Severity Measure Score: 21  If You Checked Off Any Problems, How Difficult Have These Problems Made It For You to Do Your Work, Take Care of Things at Home, or Get Along with Other People?: extremely difficult      PHQ-9 Score:   PHQ-9 Total Score: 21    Depression Screening:  Patient screened positive for depression based on a PHQ-9 score of 21 on 2023. Follow-up recommendations include: Prescribed antidepressant medication treatment and Suicide Risk Assessment performed.      KAMRAN-7 Score:  Feeling nervous, anxious or on edge: Nearly every day  Not being able to stop or control worrying: Nearly  every day  Worrying too much about different things: Nearly every day  Trouble Relaxing: Nearly every day  Being so restless that it is hard to sit still: Nearly every day  Feeling afraid as if something awful might happen: Not at all  Becoming easily annoyed or irritable: Nearly every day  KAMRAN 7 Total Score: 18       Screening for Adults With ADHD - (1-6)  1. How often do you have trouble wrapping up the final details of a project, once the challenging parts have been done?: Often  2. How often do you have difficulty getting things in order when you have to do a task that requires organization?: Often  3. How often do you have problems remembering appointments or obligations : Very Often  4. When you have a task that requires a lot of thought, how often do you avoid or delay getting started ?: Very Often  5. How often do you fidget or squirm with your hands or feet when you have to sit down for a long time?: Very Often  6. How often do you feel overly active and compelled to do things, like you were driven by a motor?: Sometimes  7. How often do you make careless mistakes when you have to work on a boring or difficult project?: Very Often  8. How often do have difficulty keeping your attention when you are doing boring or repetitive work?: Very Often  9. How often do you have difficulty concentrating on what people say to you, even when they are speaking to you: Very Often  10.How often do you misplace or have difficulty finding things at home or at work?: Very Often  11.How often are you distracted by activity or noise around you?: Very Often  12.How often do you leave your seat in meetings or other situations in which you are expected to remain seated?: Very Often  13.How often do you feel restless or fidgety?: Very Often  14.How often do you have difficulty unwinding and relaxing when you have time to yourself?: Very Often  15.How often do you find yourself talking too much when you are in social situations?:  Rarely  16.When you’re in a conversation, how often do you find yourself finishing the sentences of the people you are talking to, before they can finish them themselves?: Often  17.How often do you have difficulty waiting your turn in situations when turn taking is required?: Often  18.How often do you interrupt others when they are busy?: Rarely    MEDS ORDERED DURING VISIT:  New Medications Ordered This Visit   Medications    amphetamine-dextroamphetamine (Adderall) 12.5 MG tablet     Sig: Take 1 tablet by mouth 2 (Two) Times a Day.     Dispense:  60 tablet     Refill:  0    ARIPiprazole (ABILIFY) 5 MG tablet     Sig: Take 2 tablets by mouth Daily.     Dispense:  30 tablet     Refill:  1    venlafaxine XR (Effexor XR) 75 MG 24 hr capsule     Sig: Take 2 capsules by mouth Daily.     Dispense:  30 capsule     Refill:  1         Follow Up   Return in about 4 weeks (around 1/8/2024).  Patient was given instructions and counseling regarding his condition or for health maintenance advice. Please see specific information pulled into the AVS if appropriate.     TREATMENT PLAN/GOALS: Continue supportive psychotherapy efforts and medications as indicated. Treatment and medication options discussed during today's visit. Patient acknowledged and verbally consented to continue with current treatment plan and was educated on the importance of compliance with treatment and follow-up appointments.    MEDICATION ISSUES:  Discussed medication options and treatment plan of prescribed medication as well as the risks, benefits, and side effects including potential falls, possible impaired driving and metabolic adversities among others. Patient is agreeable to call the office with any worsening of symptoms or onset of side effects. Patient is agreeable to call 911 or go to the nearest ER should he/she begin having SI/HI.        [facility]  [address] [place] [address]  [phone]     December 11, 2023 14:55 EST

## 2023-12-13 ENCOUNTER — TELEPHONE (OUTPATIENT)
Dept: BEHAVIORAL HEALTH | Facility: CLINIC | Age: 28
End: 2023-12-13

## 2023-12-13 NOTE — TELEPHONE ENCOUNTER
Prior authorization request for DEXTROAMP-AMPHETAM 12.5 MG TAB has been approved.    Effective from 12/11/2023 to 12/10/2024.    Key: [id number]

## 2023-12-17 LAB — DRUGS UR: NORMAL

## 2023-12-21 ENCOUNTER — TELEPHONE (OUTPATIENT)
Dept: BEHAVIORAL HEALTH | Facility: CLINIC | Age: 28
End: 2023-12-21

## 2023-12-29 DIAGNOSIS — F90.2 ATTENTION DEFICIT HYPERACTIVITY DISORDER, COMBINED TYPE: ICD-10-CM

## 2023-12-31 RX ORDER — DEXTROAMPHETAMINE SACCHARATE, AMPHETAMINE ASPARTATE, DEXTROAMPHETAMINE SULFATE AND AMPHETAMINE SULFATE 3.125; 3.125; 3.125; 3.125 MG/1; MG/1; MG/1; MG/1
12.5 TABLET ORAL 2 TIMES DAILY
Qty: 60 TABLET | Refills: 0 | OUTPATIENT
Start: 2023-12-31

## 2024-02-20 ENCOUNTER — OFFICE VISIT (OUTPATIENT)
Dept: BEHAVIORAL HEALTH | Facility: CLINIC | Age: 29
End: 2024-02-20
Payer: COMMERCIAL

## 2024-02-20 VITALS
BODY MASS INDEX: 32.23 KG/M2 | HEIGHT: 72 IN | SYSTOLIC BLOOD PRESSURE: 178 MMHG | DIASTOLIC BLOOD PRESSURE: 92 MMHG | WEIGHT: 238 LBS

## 2024-02-20 DIAGNOSIS — Z51.81 ENCOUNTER FOR THERAPEUTIC DRUG MONITORING: ICD-10-CM

## 2024-02-20 DIAGNOSIS — F41.1 GENERALIZED ANXIETY DISORDER: ICD-10-CM

## 2024-02-20 DIAGNOSIS — F33.1 MODERATE EPISODE OF RECURRENT MAJOR DEPRESSIVE DISORDER: ICD-10-CM

## 2024-02-20 DIAGNOSIS — F90.2 ATTENTION DEFICIT HYPERACTIVITY DISORDER, COMBINED TYPE: Primary | ICD-10-CM

## 2024-02-20 DIAGNOSIS — F43.10 POST TRAUMATIC STRESS DISORDER (PTSD): ICD-10-CM

## 2024-02-20 RX ORDER — ARIPIPRAZOLE 10 MG/1
10 TABLET ORAL DAILY
Qty: 30 TABLET | Refills: 1 | Status: SHIPPED | OUTPATIENT
Start: 2024-02-20

## 2024-02-20 RX ORDER — VENLAFAXINE HYDROCHLORIDE 37.5 MG/1
37.5 CAPSULE, EXTENDED RELEASE ORAL DAILY
Qty: 30 CAPSULE | Refills: 1 | Status: SHIPPED | OUTPATIENT
Start: 2024-02-20

## 2024-02-20 NOTE — PROGRESS NOTES
Follow Up Office Visit    Patient Name: Dhiraj Stokes  : 1995   MRN: [id number]   Care Team: Patient Care Team:  Dodie Gorman APRN as PCP - General (Nurse Practitioner)  Josesito Herron MD as Consulting Physician (General Surgery)  Tracy Horn APRN as Nurse Practitioner (Behavioral Health)         Chief Complaint:    Chief Complaint   Patient presents with    ADHD    Anxiety    Depression    PTSD    Med Management       History of Present Illness: Dhiraj Stokes is a 28 y.o. male who is here today for a medication management follow up. Patient reports that adding the Adderall was helpful and he feels significantly helped with his focus and concentration. He also feels that the Abilify continues to help managed his mood and is still interested in weaning off of the Effexor. He denies SI/HI today.     The following portion of the patient's history were reviewed and updated appropriately: allergies, current and past medications, family history, medical history and social history.  Subjective   Review of Systems:    Review of Systems   Respiratory: Negative.     Cardiovascular: Negative.  Negative for chest pain and palpitations.   Neurological: Negative.    Psychiatric/Behavioral:  Positive for decreased concentration and stress. The patient is nervous/anxious.    All other systems reviewed and are negative.      Current Medications:   Current Outpatient Medications   Medication Sig Dispense Refill    amphetamine-dextroamphetamine (Adderall) 12.5 MG tablet Take 1 tablet by mouth 2 (Two) Times a Day. 60 tablet 0    ARIPiprazole (Abilify) 10 MG tablet Take 1 tablet by mouth Daily. 30 tablet 1    venlafaxine XR (Effexor XR) 37.5 MG 24 hr capsule Take 1 capsule by mouth Daily. 30 capsule 1     No current facility-administered medications for this visit.       Mental Status Exam:   Hygiene:   good  Cooperation:  Cooperative  Eye Contact:  Good  Psychomotor Behavior:   "Appropriate  Affect:  Appropriate  Mood: normal and anxious  Speech:  Normal  Thought Process:  Goal directed and Linear  Thought Content:  Normal and Mood congruent  Suicidal:  None  Homicidal:  None  Hallucinations:  None  Delusion:  None  Memory:  Intact  Orientation:  Person, Place, Time, and Situation  Reliability:  fair  Insight:  Fair  Judgement:  Fair  Impulse Control:  Fair  Physical/Medical Issues:  No      Objective   Vital Signs:   /92   Ht 182.9 cm (72\")   Wt 108 kg (238 lb)   BMI 32.28 kg/m²       Assessment / Plan    Diagnoses and all orders for this visit:    1. Attention deficit hyperactivity disorder, combined type (Primary)  -     Compliance Drug Analysis, Ur - Urine, Clean Catch    2. Generalized anxiety disorder  -     venlafaxine XR (Effexor XR) 37.5 MG 24 hr capsule; Take 1 capsule by mouth Daily.  Dispense: 30 capsule; Refill: 1  -     ARIPiprazole (Abilify) 10 MG tablet; Take 1 tablet by mouth Daily.  Dispense: 30 tablet; Refill: 1    3. Moderate episode of recurrent major depressive disorder  -     venlafaxine XR (Effexor XR) 37.5 MG 24 hr capsule; Take 1 capsule by mouth Daily.  Dispense: 30 capsule; Refill: 1  -     ARIPiprazole (Abilify) 10 MG tablet; Take 1 tablet by mouth Daily.  Dispense: 30 tablet; Refill: 1    4. Post traumatic stress disorder (PTSD)  -     venlafaxine XR (Effexor XR) 37.5 MG 24 hr capsule; Take 1 capsule by mouth Daily.  Dispense: 30 capsule; Refill: 1  -     ARIPiprazole (Abilify) 10 MG tablet; Take 1 tablet by mouth Daily.  Dispense: 30 tablet; Refill: 1    5. Encounter for therapeutic drug monitoring  -     Compliance Drug Analysis, Ur - Urine, Clean Catch     Discussed patient's previous drug test with him. He denies any methamphetamine use. He was agreeable to another drug test today. Advised that I would not send anymore Adderall until drug test result were in. He verbalized understanding. Will decrease Effexor and continue Abilify as " ordered.    AIMS  Facial and Oral Movements  Muscles of Facial Expression: None, normal  Lips and Perioral Area: None, normal  Jaw: None, normal  Tongue: None, normal  Extremity Movements  Upper (arms, wrists, hands, fingers): None, normal  Lower (legs, knees, ankles, toes): None, normal  Trunk Movements  Neck, shoulders, hips: None, normal  Overall Severity  Severity of abnormal movements (max 4): 0  Incapacitation due to abnormal movements: None, normal  Patient's awareness of abnormal movements (rate only patient's report): Aware, no distress  Dental Status  Current problems with teeth and/or dentures?: No  Does patient usually wear dentures?: No          PHQ-2/PHQ-9: Depression Screening  Little Interest or Pleasure in Doing Things: 3-->nearly every day  Feeling Down, Depressed or Hopeless: 2-->more than half the days  PHQ-2 Total Score: 5  Trouble Falling or Staying Asleep, or Sleeping Too Much: 2-->more than half the days  Feeling Tired or Having Little Energy: 3-->nearly every day  Poor Appetite or Overeatin-->more than half the days  Feeling Bad about Yourself - or that You are a Failure or Have Let Yourself or Your Family Down: 2-->more than half the days  Trouble Concentrating on Things, Such as Reading the Newspaper or Watching Television: 3-->nearly every day  Moving or Speaking So Slowly that Other People Could Have Noticed? Or the Opposite - Being So Fidgety: 2-->more than half the days  Thoughts that You Would be Better Off Dead or of Hurting Yourself in Some Way: 0-->not at all  PHQ-9: Brief Depression Severity Measure Score: 19  If You Checked Off Any Problems, How Difficult Have These Problems Made It For You to Do Your Work, Take Care of Things at Home, or Get Along with Other People?: extremely difficult      PHQ-9 Score:   PHQ-9 Total Score: 19    Depression Screening:  Patient screened positive for depression based on a PHQ-9 score of 19 on 2024. Follow-up recommendations include:  Prescribed antidepressant medication treatment and Suicide Risk Assessment performed.      Over the last two weeks, how often have you been bothered by the following problems?  Feeling nervous, anxious or on edge: Nearly every day  Not being able to stop or control worrying: Nearly every day  Worrying too much about different things: Nearly every day  Trouble Relaxing: Nearly every day  Being so restless that it is hard to sit still: Nearly every day  Becoming easily annoyed or irritable: Nearly every day  Feeling afraid as if something awful might happen: Nearly every day  KAMRAN 7 Total Score: 21  If you checked any problems, how difficult have these problems made it for you to do your work, take care of things at home, or get along with other people: Extremely difficult        Screening for Adults With ADHD - (1-6)  1. How often do you have trouble wrapping up the final details of a project, once the challenging parts have been done?: Often  2. How often do you have difficulty getting things in order when you have to do a task that requires organization?: Often  3. How often do you have problems remembering appointments or obligations : Very Often  4. When you have a task that requires a lot of thought, how often do you avoid or delay getting started ?: Very Often  5. How often do you fidget or squirm with your hands or feet when you have to sit down for a long time?: Very Often  6. How often do you feel overly active and compelled to do things, like you were driven by a motor?: Rarely  7. How often do you make careless mistakes when you have to work on a boring or difficult project?: Very Often  8. How often do have difficulty keeping your attention when you are doing boring or repetitive work?: Very Often  9. How often do you have difficulty concentrating on what people say to you, even when they are speaking to you: Often  10.How often do you misplace or have difficulty finding things at home or at work?: Very  Often  11.How often are you distracted by activity or noise around you?: Very Often  12.How often do you leave your seat in meetings or other situations in which you are expected to remain seated?: Often  13.How often do you feel restless or fidgety?: Very Often  14.How often do you have difficulty unwinding and relaxing when you have time to yourself?: Very Often  15.How often do you find yourself talking too much when you are in social situations?: Rarely  16.When you’re in a conversation, how often do you find yourself finishing the sentences of the people you are talking to, before they can finish them themselves?: Often  17.How often do you have difficulty waiting your turn in situations when turn taking is required?: Often  18.How often do you interrupt others when they are busy?: Often    A psychological evaluation was conducted in order to assess past and current level of functioning. Areas assessed included, but were not limited to: perception of social support, perception of ability to face and deal with challenges in life (positive functioning), anxiety symptoms, depressive symptoms, perspective on beliefs/belief system, coping skills for stress, intelligence level,  Therapeutic rapport was established. Interventions conducted today were geared towards incorporating medication management along with support for continued therapy. Education was also provided as to the med management with this provider and what to expect in subsequent sessions.      We discussed risks, benefits, goals and side effects of the above medication and the patient was agreeable with the plan. Patient was educated on the importance of compliance with treatment and follow-up appointments. Patient is aware to contact the [facility] with any worsening of symptoms. To call for questions or concerns and return early if necessary. Patent is agreeable to go to the Emergency Department or call 911 should they begin SI/HI.    MEDS  ORDERED DURING VISIT:  New Medications Ordered This Visit   Medications    venlafaxine XR (Effexor XR) 37.5 MG 24 hr capsule     Sig: Take 1 capsule by mouth Daily.     Dispense:  30 capsule     Refill:  1    ARIPiprazole (Abilify) 10 MG tablet     Sig: Take 1 tablet by mouth Daily.     Dispense:  30 tablet     Refill:  1         Follow Up   Return in about 6 weeks (around 4/2/2024).  Patient was given instructions and counseling regarding his condition or for health maintenance advice. Please see specific information pulled into the AVS if appropriate.     TREATMENT PLAN/GOALS: Continue supportive psychotherapy efforts and medications as indicated. Treatment and medication options discussed during today's visit. Patient acknowledged and verbally consented to continue with current treatment plan and was educated on the importance of compliance with treatment and follow-up appointments.    MEDICATION ISSUES:  Discussed medication options and treatment plan of prescribed medication as well as the risks, benefits, and side effects including potential falls, possible impaired driving and metabolic adversities among others. Patient is agreeable to call the office with any worsening of symptoms or onset of side effects. Patient is agreeable to call 911 or go to the nearest ER should he/she begin having SI/HI.        [facility]  [address] [place] [address] [address] [place] [address] [phone]    February 21, 2024 15:15 EST

## 2024-02-28 DIAGNOSIS — F90.2 ATTENTION DEFICIT HYPERACTIVITY DISORDER, COMBINED TYPE: ICD-10-CM

## 2024-02-28 LAB — DRUGS UR: NORMAL

## 2024-02-28 RX ORDER — DEXTROAMPHETAMINE SACCHARATE, AMPHETAMINE ASPARTATE, DEXTROAMPHETAMINE SULFATE AND AMPHETAMINE SULFATE 3.125; 3.125; 3.125; 3.125 MG/1; MG/1; MG/1; MG/1
12.5 TABLET ORAL 2 TIMES DAILY
Qty: 60 TABLET | Refills: 0 | Status: SHIPPED | OUTPATIENT
Start: 2024-02-28

## 2024-03-04 ENCOUNTER — TELEPHONE (OUTPATIENT)
Dept: BEHAVIORAL HEALTH | Facility: CLINIC | Age: 29
End: 2024-03-04
Payer: COMMERCIAL

## 2024-03-04 DIAGNOSIS — F41.1 GENERALIZED ANXIETY DISORDER: Primary | ICD-10-CM

## 2024-03-04 RX ORDER — BUSPIRONE HYDROCHLORIDE 10 MG/1
10 TABLET ORAL 2 TIMES DAILY PRN
Qty: 60 TABLET | Refills: 2 | Status: SHIPPED | OUTPATIENT
Start: 2024-03-04

## 2024-03-04 NOTE — TELEPHONE ENCOUNTER
PATIENT CALLED IN SAID HIS STOMACH FEELS LIKE ITS IN KNOTS NERVOUS WANTS TO KNOW IF YOU CAN GIVE HIM SOMETHING FOR IT

## 2024-03-20 DIAGNOSIS — F43.10 POST TRAUMATIC STRESS DISORDER (PTSD): ICD-10-CM

## 2024-03-20 DIAGNOSIS — F33.1 MODERATE EPISODE OF RECURRENT MAJOR DEPRESSIVE DISORDER: ICD-10-CM

## 2024-03-20 DIAGNOSIS — F90.2 ATTENTION DEFICIT HYPERACTIVITY DISORDER, COMBINED TYPE: ICD-10-CM

## 2024-03-20 DIAGNOSIS — F41.1 GENERALIZED ANXIETY DISORDER: ICD-10-CM

## 2024-03-20 RX ORDER — DEXTROAMPHETAMINE SACCHARATE, AMPHETAMINE ASPARTATE, DEXTROAMPHETAMINE SULFATE AND AMPHETAMINE SULFATE 3.125; 3.125; 3.125; 3.125 MG/1; MG/1; MG/1; MG/1
12.5 TABLET ORAL 2 TIMES DAILY
Qty: 60 TABLET | Refills: 0 | Status: SHIPPED | OUTPATIENT
Start: 2024-03-20

## 2024-03-20 RX ORDER — VENLAFAXINE HYDROCHLORIDE 37.5 MG/1
37.5 CAPSULE, EXTENDED RELEASE ORAL DAILY
Qty: 30 CAPSULE | Refills: 1 | Status: SHIPPED | OUTPATIENT
Start: 2024-03-20

## 2024-03-20 RX ORDER — ARIPIPRAZOLE 10 MG/1
10 TABLET ORAL DAILY
Qty: 30 TABLET | Refills: 1 | Status: SHIPPED | OUTPATIENT
Start: 2024-03-20

## 2024-03-20 NOTE — TELEPHONE ENCOUNTER
[facility]  Rx Refill Note  Requested Prescriptions     Pending Prescriptions Disp Refills    amphetamine-dextroamphetamine (Adderall) 12.5 MG tablet 60 tablet 0     Sig: Take 1 tablet by mouth 2 (Two) Times a Day.     Signed Prescriptions Disp Refills    venlafaxine XR (Effexor XR) 37.5 MG 24 hr capsule 30 capsule 1     Sig: Take 1 capsule by mouth Daily.     Authorizing Provider: [facility] HOPPER     Ordering User: REMEDIOS FU    ARIPiprazole (Abilify) 10 MG tablet 30 tablet 1     Sig: Take 1 tablet by mouth Daily.     Authorizing Provider: TG HOPPER     Ordering User: REMEDIOS FU      Last office visit with prescribing clinician: 2/20/2024   Last telemedicine visit with prescribing clinician: Visit date not found   Next office visit with prescribing clinician: 4/11/2024                         Would you like a call back once the refill request has been completed: [] Yes [] No    If the office needs to give you a call back, can they leave a voicemail: [] Yes [] No    Remedios Fu MA  03/20/24, 16:51 EDT

## 2024-04-11 ENCOUNTER — TELEPHONE (OUTPATIENT)
Dept: BEHAVIORAL HEALTH | Facility: CLINIC | Age: 29
End: 2024-04-11

## 2024-04-11 DIAGNOSIS — F90.2 ATTENTION DEFICIT HYPERACTIVITY DISORDER, COMBINED TYPE: Primary | ICD-10-CM

## 2024-04-11 RX ORDER — DEXTROAMPHETAMINE SACCHARATE, AMPHETAMINE ASPARTATE, DEXTROAMPHETAMINE SULFATE AND AMPHETAMINE SULFATE 3.75; 3.75; 3.75; 3.75 MG/1; MG/1; MG/1; MG/1
15 TABLET ORAL 2 TIMES DAILY
Qty: 60 TABLET | Refills: 0 | Status: SHIPPED | OUTPATIENT
Start: 2024-04-11 | End: 2024-04-24 | Stop reason: SDUPTHER

## 2024-04-16 DIAGNOSIS — F43.10 POST TRAUMATIC STRESS DISORDER (PTSD): ICD-10-CM

## 2024-04-16 DIAGNOSIS — F41.1 GENERALIZED ANXIETY DISORDER: ICD-10-CM

## 2024-04-16 DIAGNOSIS — F90.2 ATTENTION DEFICIT HYPERACTIVITY DISORDER, COMBINED TYPE: ICD-10-CM

## 2024-04-16 DIAGNOSIS — F33.1 MODERATE EPISODE OF RECURRENT MAJOR DEPRESSIVE DISORDER: ICD-10-CM

## 2024-04-17 RX ORDER — VENLAFAXINE HYDROCHLORIDE 37.5 MG/1
37.5 CAPSULE, EXTENDED RELEASE ORAL DAILY
Qty: 30 CAPSULE | Refills: 1 | OUTPATIENT
Start: 2024-04-17

## 2024-04-17 RX ORDER — DEXTROAMPHETAMINE SACCHARATE, AMPHETAMINE ASPARTATE, DEXTROAMPHETAMINE SULFATE AND AMPHETAMINE SULFATE 3.75; 3.75; 3.75; 3.75 MG/1; MG/1; MG/1; MG/1
15 TABLET ORAL 2 TIMES DAILY
Qty: 60 TABLET | Refills: 0 | OUTPATIENT
Start: 2024-04-17

## 2024-04-17 RX ORDER — ARIPIPRAZOLE 10 MG/1
10 TABLET ORAL DAILY
Qty: 30 TABLET | Refills: 1 | OUTPATIENT
Start: 2024-04-17

## 2024-04-24 DIAGNOSIS — F90.2 ATTENTION DEFICIT HYPERACTIVITY DISORDER, COMBINED TYPE: ICD-10-CM

## 2024-04-24 DIAGNOSIS — F41.1 GENERALIZED ANXIETY DISORDER: ICD-10-CM

## 2024-04-24 DIAGNOSIS — F33.1 MODERATE EPISODE OF RECURRENT MAJOR DEPRESSIVE DISORDER: ICD-10-CM

## 2024-04-24 DIAGNOSIS — F43.10 POST TRAUMATIC STRESS DISORDER (PTSD): ICD-10-CM

## 2024-04-24 RX ORDER — DEXTROAMPHETAMINE SACCHARATE, AMPHETAMINE ASPARTATE, DEXTROAMPHETAMINE SULFATE AND AMPHETAMINE SULFATE 3.75; 3.75; 3.75; 3.75 MG/1; MG/1; MG/1; MG/1
15 TABLET ORAL 2 TIMES DAILY
Qty: 60 TABLET | Refills: 0 | Status: SHIPPED | OUTPATIENT
Start: 2024-04-24

## 2024-04-24 RX ORDER — VENLAFAXINE HYDROCHLORIDE 37.5 MG/1
37.5 CAPSULE, EXTENDED RELEASE ORAL DAILY
Qty: 30 CAPSULE | Refills: 1 | OUTPATIENT
Start: 2024-04-24

## 2024-04-24 RX ORDER — ARIPIPRAZOLE 10 MG/1
10 TABLET ORAL DAILY
Qty: 30 TABLET | Refills: 1 | OUTPATIENT
Start: 2024-04-24

## 2024-04-24 NOTE — TELEPHONE ENCOUNTER
Adderall sent 4/11    Rx Refill Note  Requested Prescriptions     Pending Prescriptions Disp Refills    amphetamine-dextroamphetamine (Adderall) 15 MG tablet 60 tablet 0     Sig: Take 1 tablet by mouth 2 (Two) Times a Day.     Refused Prescriptions Disp Refills    venlafaxine XR (Effexor XR) 37.5 MG 24 hr capsule 30 capsule 1     Sig: Take 1 capsule by mouth Daily.     Refused By: REMEDIOS MCINTOSH     Reason for Refusal: Patient has requested refill too soon    ARIPiprazole (Abilify) 10 MG tablet 30 tablet 1     Sig: Take 1 tablet by mouth Daily.     Refused By: REMEDIOS MCINTOSH     Reason for Refusal: Patient has requested refill too soon      Last office visit with prescribing clinician: 2/20/2024   Last telemedicine visit with prescribing clinician: Visit date not found   Next office visit with prescribing clinician: 5/23/2024                         Would you like a call back once the refill request has been completed: [] Yes [] No    If the office needs to give you a call back, can they leave a voicemail: [] Yes [] No    Remedios Mcintosh MA  04/24/24, 15:15 EDT

## 2024-05-23 DIAGNOSIS — F43.10 POST TRAUMATIC STRESS DISORDER (PTSD): ICD-10-CM

## 2024-05-23 DIAGNOSIS — F33.1 MODERATE EPISODE OF RECURRENT MAJOR DEPRESSIVE DISORDER: ICD-10-CM

## 2024-05-23 DIAGNOSIS — F90.2 ATTENTION DEFICIT HYPERACTIVITY DISORDER, COMBINED TYPE: ICD-10-CM

## 2024-05-23 DIAGNOSIS — F41.1 GENERALIZED ANXIETY DISORDER: ICD-10-CM

## 2024-05-23 RX ORDER — VENLAFAXINE HYDROCHLORIDE 37.5 MG/1
37.5 CAPSULE, EXTENDED RELEASE ORAL DAILY
Qty: 30 CAPSULE | Refills: 1 | Status: SHIPPED | OUTPATIENT
Start: 2024-05-23

## 2024-05-23 RX ORDER — DEXTROAMPHETAMINE SACCHARATE, AMPHETAMINE ASPARTATE, DEXTROAMPHETAMINE SULFATE AND AMPHETAMINE SULFATE 3.75; 3.75; 3.75; 3.75 MG/1; MG/1; MG/1; MG/1
15 TABLET ORAL 2 TIMES DAILY
Qty: 60 TABLET | Refills: 0 | Status: SHIPPED | OUTPATIENT
Start: 2024-05-23

## 2024-05-23 RX ORDER — ARIPIPRAZOLE 10 MG/1
10 TABLET ORAL DAILY
Qty: 30 TABLET | Refills: 1 | Status: SHIPPED | OUTPATIENT
Start: 2024-05-23

## 2024-06-18 DIAGNOSIS — F90.2 ATTENTION DEFICIT HYPERACTIVITY DISORDER, COMBINED TYPE: ICD-10-CM

## 2024-06-18 DIAGNOSIS — F33.1 MODERATE EPISODE OF RECURRENT MAJOR DEPRESSIVE DISORDER: ICD-10-CM

## 2024-06-18 DIAGNOSIS — F43.10 POST TRAUMATIC STRESS DISORDER (PTSD): ICD-10-CM

## 2024-06-18 DIAGNOSIS — F41.1 GENERALIZED ANXIETY DISORDER: ICD-10-CM

## 2024-06-18 RX ORDER — VENLAFAXINE HYDROCHLORIDE 37.5 MG/1
37.5 CAPSULE, EXTENDED RELEASE ORAL DAILY
Qty: 30 CAPSULE | Refills: 1 | Status: SHIPPED | OUTPATIENT
Start: 2024-06-18

## 2024-06-18 RX ORDER — DEXTROAMPHETAMINE SACCHARATE, AMPHETAMINE ASPARTATE, DEXTROAMPHETAMINE SULFATE AND AMPHETAMINE SULFATE 3.75; 3.75; 3.75; 3.75 MG/1; MG/1; MG/1; MG/1
15 TABLET ORAL 2 TIMES DAILY
Qty: 60 TABLET | Refills: 0 | Status: SHIPPED | OUTPATIENT
Start: 2024-06-18

## 2024-06-18 RX ORDER — ARIPIPRAZOLE 10 MG/1
10 TABLET ORAL DAILY
Qty: 30 TABLET | Refills: 1 | Status: SHIPPED | OUTPATIENT
Start: 2024-06-18

## 2024-06-18 NOTE — TELEPHONE ENCOUNTER
Rx Refill Note  Requested Prescriptions     Pending Prescriptions Disp Refills    ARIPiprazole (Abilify) 10 MG tablet 30 tablet 1     Sig: Take 1 tablet by mouth Daily.    amphetamine-dextroamphetamine (Adderall) 15 MG tablet 60 tablet 0     Sig: Take 1 tablet by mouth 2 (Two) Times a Day.    venlafaxine XR (Effexor XR) 37.5 MG 24 hr capsule 30 capsule 1     Sig: Take 1 capsule by mouth Daily.      Last office visit with prescribing clinician: 2/20/2024   Last telemedicine visit with prescribing clinician: Visit date not found   Next office visit with prescribing clinician: 7/22/2024     Ok to fill?    UDS and CSA are currently up to date.    Arleth Jean MA  06/18/24, 13:32 EDT

## (undated) DEVICE — SUT VIC 0/0 UR6 27IN DYED J603H

## (undated) DEVICE — BLD CLIP UNIV SURG GRY

## (undated) DEVICE — 3M™ STERI-STRIP™ REINFORCED ADHESIVE SKIN CLOSURES, R1547, 1/2 IN X 4 IN (12 MM X 100 MM), 6 STRIPS/ENVELOPE: Brand: 3M™ STERI-STRIP™

## (undated) DEVICE — GLV SURG SENSICARE W/ALOE PF LF 8.5 STRL

## (undated) DEVICE — ENDOPATH XCEL UNIVERSAL TROCAR STABLILITY SLEEVES: Brand: ENDOPATH XCEL

## (undated) DEVICE — UNDYED BRAIDED (POLYGLACTIN 910), SYNTHETIC ABSORBABLE SUTURE: Brand: COATED VICRYL

## (undated) DEVICE — RICH GENERAL LAPAROSCOPY: Brand: MEDLINE INDUSTRIES, INC.

## (undated) DEVICE — ENDOPATH XCEL BLADELESS TROCARS WITH STABILITY SLEEVES: Brand: ENDOPATH XCEL

## (undated) DEVICE — 2, DISPOSABLE SUCTION/IRRIGATOR WITHOUT DISPOSABLE TIP: Brand: STRYKEFLOW

## (undated) DEVICE — ENDOPOUCH RETRIEVER SPECIMEN RETRIEVAL BAGS: Brand: ENDOPOUCH RETRIEVER

## (undated) DEVICE — ENDOPATH ETS-FLEX45 ARTICULATING ENDOSCOPIC LINEAR CUTTER, NO RELOAD: Brand: ENDOPATH

## (undated) DEVICE — MONOPOLAR METZENBAUM SCISSOR, MINI BLADE TIP, DISPOSABLE: Brand: MONOPOLAR METZENBAUM SCISSOR, MINI BLADE TIP, DISPOSABLE

## (undated) DEVICE — CUFF SCD HEMOFORCE SEQ CALF STD MD